# Patient Record
Sex: FEMALE | Employment: PART TIME | URBAN - METROPOLITAN AREA
[De-identification: names, ages, dates, MRNs, and addresses within clinical notes are randomized per-mention and may not be internally consistent; named-entity substitution may affect disease eponyms.]

---

## 2018-03-19 ENCOUNTER — HOSPITAL ENCOUNTER (OUTPATIENT)
Age: 23
Setting detail: SPECIMEN
Discharge: HOME | End: 2018-03-19
Payer: MEDICARE

## 2018-03-20 LAB
CHLAMYDIA BY THIN PREP: ABNORMAL
N. GONORRHOEAE DNA, THIN PREP: NEGATIVE

## 2018-04-04 LAB — CYTOLOGY REPORT: NORMAL

## 2018-08-28 ENCOUNTER — HOSPITAL ENCOUNTER (OUTPATIENT)
Age: 23
Setting detail: SPECIMEN
Discharge: HOME | End: 2018-08-28
Payer: MEDICARE

## 2018-08-28 LAB
ALBUMIN SERPL-MCNC: 4.4 G/DL (ref 3.5–5.2)
ALBUMIN/GLOBULIN RATIO: 1.4 (ref 1–2.5)
ALP BLD-CCNC: 58 U/L (ref 35–104)
ALT SERPL-CCNC: 10 U/L (ref 5–33)
ANION GAP SERPL CALCULATED.3IONS-SCNC: 12 MMOL/L (ref 9–17)
AST SERPL-CCNC: 14 U/L
BILIRUB SERPL-MCNC: 0.22 MG/DL (ref 0.3–1.2)
BUN BLDV-MCNC: 11 MG/DL (ref 6–20)
BUN/CREAT BLD: ABNORMAL (ref 9–20)
CALCIUM SERPL-MCNC: 9.4 MG/DL (ref 8.6–10.4)
CHLORIDE BLD-SCNC: 108 MMOL/L (ref 98–107)
CO2: 23 MMOL/L (ref 20–31)
CREAT SERPL-MCNC: 0.75 MG/DL (ref 0.5–0.9)
ESTIMATED AVERAGE GLUCOSE: 103 MG/DL
GFR AFRICAN AMERICAN: >60 ML/MIN
GFR NON-AFRICAN AMERICAN: >60 ML/MIN
GFR SERPL CREATININE-BSD FRML MDRD: ABNORMAL ML/MIN/{1.73_M2}
GFR SERPL CREATININE-BSD FRML MDRD: ABNORMAL ML/MIN/{1.73_M2}
GLUCOSE BLD-MCNC: 84 MG/DL (ref 70–99)
HAV IGM SER IA-ACNC: NONREACTIVE
HBA1C MFR BLD: 5.2 % (ref 4–6)
HEPATITIS B CORE IGM ANTIBODY: NONREACTIVE
HEPATITIS B SURFACE ANTIGEN: NONREACTIVE
HEPATITIS C ANTIBODY: NONREACTIVE
POTASSIUM SERPL-SCNC: 4.2 MMOL/L (ref 3.7–5.3)
RUBV IGG SER QL: 27.1 IU/ML
SODIUM BLD-SCNC: 143 MMOL/L (ref 135–144)
TOTAL PROTEIN: 7.6 G/DL (ref 6.4–8.3)
TSH SERPL DL<=0.05 MIU/L-ACNC: 0.66 MIU/L (ref 0.3–5)

## 2018-08-29 LAB
MEASLES IMMUNE (IGG): 1.06
MUV IGG SER QL: 2.18
VZV IGG SER QL IA: 1.26

## 2018-08-30 LAB
AMPHETAMINE: NEGATIVE NG/ML
BARBITURATES: NEGATIVE NG/ML
BENZODIAZEPINES: NEGATIVE NG/ML
BUPRENORPHINE: NEGATIVE NG/ML
COCAINE: NEGATIVE NG/ML
DRUGS OF ABUSE COMMENT: NORMAL
METHADONE: NEGATIVE NG/ML
METHAMPHETAMINE: NEGATIVE NG/ML
OPIATES: NEGATIVE NG/ML
OXYCODONE: NEGATIVE NG/ML
PHENCYCLIDINE: NEGATIVE NG/ML
THC: NEGATIVE NG/ML

## 2019-05-30 ENCOUNTER — HOSPITAL ENCOUNTER (EMERGENCY)
Age: 24
Discharge: HOME | End: 2019-05-30
Attending: EMERGENCY MEDICINE
Payer: COMMERCIAL

## 2019-05-30 ENCOUNTER — APPOINTMENT (OUTPATIENT)
Dept: GENERAL RADIOLOGY | Age: 24
End: 2019-05-30
Payer: COMMERCIAL

## 2019-05-30 VITALS
WEIGHT: 190 LBS | DIASTOLIC BLOOD PRESSURE: 63 MMHG | OXYGEN SATURATION: 98 % | SYSTOLIC BLOOD PRESSURE: 117 MMHG | HEART RATE: 82 BPM | BODY MASS INDEX: 33.66 KG/M2 | RESPIRATION RATE: 22 BRPM | HEIGHT: 63 IN | TEMPERATURE: 98.7 F

## 2019-05-30 DIAGNOSIS — R07.9 CHEST PAIN, UNSPECIFIED TYPE: Primary | ICD-10-CM

## 2019-05-30 LAB
ABSOLUTE EOS #: 0.08 K/UL (ref 0–0.44)
ABSOLUTE IMMATURE GRANULOCYTE: <0.03 K/UL (ref 0–0.3)
ABSOLUTE LYMPH #: 2.46 K/UL (ref 1.1–3.7)
ABSOLUTE MONO #: 0.4 K/UL (ref 0.1–1.2)
ANION GAP SERPL CALCULATED.3IONS-SCNC: 11 MMOL/L (ref 9–17)
BASOPHILS # BLD: 0 % (ref 0–2)
BASOPHILS ABSOLUTE: <0.03 K/UL (ref 0–0.2)
BUN BLDV-MCNC: 14 MG/DL (ref 6–20)
BUN/CREAT BLD: ABNORMAL (ref 9–20)
CALCIUM SERPL-MCNC: 9.1 MG/DL (ref 8.6–10.4)
CHLORIDE BLD-SCNC: 108 MMOL/L (ref 98–107)
CO2: 23 MMOL/L (ref 20–31)
CREAT SERPL-MCNC: 0.75 MG/DL (ref 0.5–0.9)
DIFFERENTIAL TYPE: ABNORMAL
EKG ATRIAL RATE: 77 BPM
EKG P AXIS: 56 DEGREES
EKG P-R INTERVAL: 144 MS
EKG Q-T INTERVAL: 380 MS
EKG QRS DURATION: 74 MS
EKG QTC CALCULATION (BAZETT): 430 MS
EKG R AXIS: 55 DEGREES
EKG T AXIS: 34 DEGREES
EKG VENTRICULAR RATE: 77 BPM
EOSINOPHILS RELATIVE PERCENT: 1 % (ref 1–4)
GFR AFRICAN AMERICAN: >60 ML/MIN
GFR NON-AFRICAN AMERICAN: >60 ML/MIN
GFR SERPL CREATININE-BSD FRML MDRD: ABNORMAL ML/MIN/{1.73_M2}
GFR SERPL CREATININE-BSD FRML MDRD: ABNORMAL ML/MIN/{1.73_M2}
GLUCOSE BLD-MCNC: 96 MG/DL (ref 70–99)
HCT VFR BLD CALC: 36.9 % (ref 36.3–47.1)
HEMOGLOBIN: 11.1 G/DL (ref 11.9–15.1)
IMMATURE GRANULOCYTES: 0 %
LYMPHOCYTES # BLD: 45 % (ref 24–43)
MAGNESIUM: 2 MG/DL (ref 1.6–2.6)
MCH RBC QN AUTO: 29.4 PG (ref 25.2–33.5)
MCHC RBC AUTO-ENTMCNC: 30.1 G/DL (ref 28.4–34.8)
MCV RBC AUTO: 97.6 FL (ref 82.6–102.9)
MONOCYTES # BLD: 7 % (ref 3–12)
NRBC AUTOMATED: 0 PER 100 WBC
PDW BLD-RTO: 12.5 % (ref 11.8–14.4)
PLATELET # BLD: 274 K/UL (ref 138–453)
PLATELET ESTIMATE: ABNORMAL
PMV BLD AUTO: 10.2 FL (ref 8.1–13.5)
POTASSIUM SERPL-SCNC: 3.9 MMOL/L (ref 3.7–5.3)
RBC # BLD: 3.78 M/UL (ref 3.95–5.11)
RBC # BLD: ABNORMAL 10*6/UL
SEG NEUTROPHILS: 47 % (ref 36–65)
SEGMENTED NEUTROPHILS ABSOLUTE COUNT: 2.55 K/UL (ref 1.5–8.1)
SODIUM BLD-SCNC: 142 MMOL/L (ref 135–144)
TROPONIN INTERP: NORMAL
TROPONIN T: NORMAL NG/ML
TROPONIN, HIGH SENSITIVITY: <6 NG/L (ref 0–14)
WBC # BLD: 5.5 K/UL (ref 3.5–11.3)
WBC # BLD: ABNORMAL 10*3/UL

## 2019-05-30 PROCEDURE — 93005 ELECTROCARDIOGRAM TRACING: CPT | Performed by: EMERGENCY MEDICINE

## 2019-05-30 PROCEDURE — 93005 ELECTROCARDIOGRAM TRACING: CPT

## 2019-05-30 PROCEDURE — 85025 COMPLETE CBC W/AUTO DIFF WBC: CPT

## 2019-05-30 PROCEDURE — 93010 ELECTROCARDIOGRAM REPORT: CPT | Performed by: INTERNAL MEDICINE

## 2019-05-30 PROCEDURE — 71046 X-RAY EXAM CHEST 2 VIEWS: CPT

## 2019-05-30 PROCEDURE — 84484 ASSAY OF TROPONIN QUANT: CPT

## 2019-05-30 PROCEDURE — 6370000000 HC RX 637 (ALT 250 FOR IP): Performed by: EMERGENCY MEDICINE

## 2019-05-30 PROCEDURE — 80048 BASIC METABOLIC PNL TOTAL CA: CPT

## 2019-05-30 PROCEDURE — 99285 EMERGENCY DEPT VISIT HI MDM: CPT

## 2019-05-30 PROCEDURE — 83735 ASSAY OF MAGNESIUM: CPT

## 2019-05-30 RX ORDER — FAMOTIDINE 20 MG/1
20 TABLET, FILM COATED ORAL 2 TIMES DAILY PRN
Qty: 60 TABLET | Refills: 0 | Status: SHIPPED | OUTPATIENT
Start: 2019-05-30 | End: 2021-09-11

## 2019-05-30 RX ORDER — IBUPROFEN 800 MG/1
800 TABLET ORAL EVERY 8 HOURS PRN
Qty: 45 TABLET | Refills: 0 | Status: SHIPPED | OUTPATIENT
Start: 2019-05-30 | End: 2020-06-12

## 2019-05-30 RX ORDER — PANTOPRAZOLE SODIUM 20 MG/1
20 TABLET, DELAYED RELEASE ORAL
Qty: 30 TABLET | Refills: 0 | Status: SHIPPED | OUTPATIENT
Start: 2019-05-30 | End: 2021-09-11

## 2019-05-30 RX ORDER — CALCIUM CARBONATE 200(500)MG
1 TABLET,CHEWABLE ORAL DAILY
Qty: 30 TABLET | Refills: 0 | Status: SHIPPED | OUTPATIENT
Start: 2019-05-30 | End: 2019-06-29

## 2019-05-30 RX ORDER — ASPIRIN 81 MG/1
324 TABLET, CHEWABLE ORAL ONCE
Status: COMPLETED | OUTPATIENT
Start: 2019-05-30 | End: 2019-05-30

## 2019-05-30 RX ADMIN — ASPIRIN 81 MG 324 MG: 81 TABLET ORAL at 08:31

## 2019-05-30 SDOH — HEALTH STABILITY: MENTAL HEALTH: HOW OFTEN DO YOU HAVE A DRINK CONTAINING ALCOHOL?: NEVER

## 2019-05-30 ASSESSMENT — ENCOUNTER SYMPTOMS
DIARRHEA: 0
VOMITING: 0
ABDOMINAL PAIN: 0
CHEST TIGHTNESS: 0
SHORTNESS OF BREATH: 0
COUGH: 0
EYE REDNESS: 0
EYE DISCHARGE: 0
RHINORRHEA: 0
NAUSEA: 0
SORE THROAT: 0
BLOOD IN STOOL: 0

## 2019-05-30 NOTE — ED PROVIDER NOTES
[facility] ED  Emergency Department Encounter  EmergencyMedicine Resident     Pt Allie Marie  MRN: [id number]  [place] 1995  Date of evaluation: 5/30/19  PCP:  Bassam Leyva NP-C    [address] [place] [address]       Chief Complaint   Patient presents with    Chest Pain     pt was woken from her sleep with a sharp chest pain shooting through to her back followed by dizziness       HISTORY OF PRESENT ILLNESS  (Location/Symptom, Timing/Onset, Context/Setting, Quality, Duration, Modifying Factors, Severity.)      Jennifer Dacosta is a 21 y.o. female who presents with complaints of chest pain that started when she was walking to work. Pt states that she has had this similar chest pain, not associated with exertion, for the past several years and this is the first time that it has happened with exertion. Pt is well appearing, non-toxic, no significant risk factors, low risk for ACS, PERC rule used to rule out PE, no daily medications. No symptoms on initial evaluation. Patient denies any headache or change in vision, no nausea or vomiting, no fevers or chills, + chest pain, no shortness of breath, no abdominal pain, and no  symptoms including no hematuria or blood in stool, as well as no dysuria. PAST MEDICAL / SURGICAL / SOCIAL / FAMILY HISTORY      has no past medical history on file. has no past surgical history on file.     Social History     Socioeconomic History    Marital status: Single     Spouse name: Not on file    Number of children: Not on file    Years of education: Not on file    Highest education level: Not on file   Occupational History    Not on file   Social Needs    Financial resource strain: Not on file    Food insecurity:     Worry: Not on file     Inability: Not on file    Transportation needs:     Medical: Not on file     Non-medical: Not on file   Tobacco Use    Smoking status: Never Smoker    Smokeless tobacco: Never Used   Substance and Sexual Activity    warm and dry. Capillary refill takes less than 2 seconds. No rash noted. Psychiatric: She has a normal mood and affect.        DIFFERENTIAL  DIAGNOSIS     PLAN (LABS / IMAGING / EKG):  Orders Placed This Encounter   Procedures    XR CHEST STANDARD (2 VW)    Basic Metabolic Panel    CBC Auto Differential    Magnesium    Troponin    BP check on specific side/area    Telemetry monitoring    EKG 12 Lead    EKG REPORT    Insert peripheral IV       MEDICATIONS ORDERED:  Orders Placed This Encounter   Medications    aspirin chewable tablet 324 mg    famotidine (PEPCID) 20 MG tablet     Sig: Take 1 tablet by mouth 2 times daily as needed (reflux)     Dispense:  60 tablet     Refill:  0    pantoprazole (PROTONIX) 20 MG tablet     Sig: Take 1 tablet by mouth every morning (before breakfast)     Dispense:  30 tablet     Refill:  0    calcium carbonate (ANTACID) 500 MG chewable tablet     Sig: Take 1 tablet by mouth daily     Dispense:  30 tablet     Refill:  0    ibuprofen (IBU) 800 MG tablet     Sig: Take 1 tablet by mouth every 8 hours as needed for Pain     Dispense:  45 tablet     Refill:  0       DDX: MSK chest pain, GERD, MI/CAD, pleuritis, DVT/PE    DIAGNOSTIC RESULTS / EMERGENCY DEPARTMENT COURSE / MDM     LABS:  Results for orders placed or performed during the hospital encounter of 94/75/19   Basic Metabolic Panel   Result Value Ref Range    Glucose 96 70 - 99 mg/dL    BUN 14 6 - 20 mg/dL    CREATININE 0.75 0.50 - 0.90 mg/dL    Bun/Cre Ratio NOT REPORTED 9 - 20    Calcium 9.1 8.6 - 10.4 mg/dL    Sodium 142 135 - 144 mmol/L    Potassium 3.9 3.7 - 5.3 mmol/L    Chloride 108 (H) 98 - 107 mmol/L    CO2 23 20 - 31 mmol/L    Anion Gap 11 9 - 17 mmol/L    GFR Non-African American >60 >60 mL/min    GFR African American >60 >60 mL/min    GFR Comment          GFR Staging NOT REPORTED    CBC Auto Differential   Result Value Ref Range    WBC 5.5 3.5 - 11.3 k/uL    RBC 3.78 (L) 3.95 - 5.11 m/uL    Hemoglobin 11.1 (L) 11.9 - 15.1 g/dL    Hematocrit 36.9 36.3 - 47.1 %    MCV 97.6 82.6 - 102.9 fL    MCH 29.4 25.2 - 33.5 pg    MCHC 30.1 28.4 - 34.8 g/dL    RDW 12.5 11.8 - 14.4 %    Platelets 306 151 - 242 k/uL    MPV 10.2 8.1 - 13.5 fL    NRBC Automated 0.0 0.0 per 100 WBC    Differential Type NOT REPORTED     Seg Neutrophils 47 36 - 65 %    Lymphocytes 45 (H) 24 - 43 %    Monocytes 7 3 - 12 %    Eosinophils % 1 1 - 4 %    Basophils 0 0 - 2 %    Immature Granulocytes 0 0 %    Segs Absolute 2.55 1.50 - 8.10 k/uL    Absolute Lymph # 2.46 1.10 - 3.70 k/uL    Absolute Mono # 0.40 0.10 - 1.20 k/uL    Absolute Eos # 0.08 0.00 - 0.44 k/uL    Basophils # <0.03 0.00 - 0.20 k/uL    Absolute Immature Granulocyte <0.03 0.00 - 0.30 k/uL    WBC Morphology NOT REPORTED     RBC Morphology NOT REPORTED     Platelet Estimate NOT REPORTED    Magnesium   Result Value Ref Range    Magnesium 2.0 1.6 - 2.6 mg/dL   Troponin   Result Value Ref Range    Troponin, High Sensitivity <6 0 - 14 ng/L    Troponin T NOT REPORTED <0.03 ng/mL    Troponin Interp NOT REPORTED    EKG 12 Lead   Result Value Ref Range    Ventricular Rate 77 BPM    Atrial Rate 77 BPM    P-R Interval 144 ms    QRS Duration 74 ms    Q-T Interval 380 ms    QTc Calculation (Bazett) 430 ms    P Axis 56 degrees    R Axis 55 degrees    T Axis 34 degrees       IMPRESSION/EMERGENCY DEPARTMENT COURSE:        ED Course as of May 30 1703   Thu May 30, 2019   0803 Plan is to obtain cardiac workup and reevaluate. Patient has no symptoms currently but states this episode of chest pain is different than her normal as it was exertional.  Patient states she has never had any workup for this in the past.  Patient is well-appearing, nontoxic, pending negative workup, likely discharge home to follow-up with primary care physician.     [JM]   4007 Cardiac workup unremarkable, discharged home with a prescription for Pepcid, pantoprazole, and Maalox along with Motrin and have her follow-up with her primary care physician or return if symptoms worsen or new onset symptoms occurs. Patient agreeable to plan stable for discharge home. Patient instructed to return to the Emergency Department if symptoms worsen or new onset of symptoms occurs. If patient did not have a Primary Care physician, they were given contact information to contact [facility] to setup as a new patient, for continued care and treatment. Discussed findings of laboratory workup and imaging, if applicable. All questions and concerns were answered, and patient offered no additional complaints or concerns. Return precautions were given to patient, patient acknowledged understanding of return precautions and was able to relay signs and symptoms back that would need them to return to the Emergency Department. All prescriptions if given were discussed. Pt is agreeable to plan and is stable for discharge home at this time. [JM]      ED Course User Index  [JM] Kristine Favorite, DO     Low risk for ACS, PERC'd out. PCP follow up. Negative cardiac workup. Clinically improved on re-evaluation. RADIOLOGY:  Xr Chest Standard (2 Vw)    Result Date: 5/30/2019  EXAMINATION: TWO XRAY VIEWS OF THE CHEST 5/30/2019 8:08 am COMPARISON: None. HISTORY: ORDERING SYSTEM PROVIDED HISTORY: chest pain TECHNOLOGIST PROVIDED HISTORY: chest pain Ordering Physician Provided Reason for Exam: cp since this am Acuity: Unknown Type of Exam: Unknown FINDINGS: Heart size and pulmonary vessels are within normal limits. Lungs are clear. No focal infiltrates or significant pleural effusions are seen. Monitor leads overlie the chest.  There is no acute osseous abnormality. No acute cardiopulmonary process.        EKG  EKG Interpretation    Interpreted by me    Rhythm: normal sinus   Rate: normal  Axis: normal  Ectopy: none  Conduction: normal  ST Segments: no acute change  T Waves: no acute change  Q Waves: none    Clinical Impression: no acute changes and normal EKG    All EKG's are interpreted by the Emergency Department Physician who either signs or Co-signs thischart in the absence of a cardiologist.      PROCEDURES:  None    CONSULTS:  None    CRITICAL CARE:  None    FINAL IMPRESSION      1.  Chest pain, unspecified type          DISPOSITION / PLAN     DISPOSITION        PATIENT REFERRED TO:  Zoë Costa  [address] [facility] [address] [facility]  [phone]    Schedule an appointment as soon as possible for a visit       [facility] ED  [address] [address]  [phone]  Go to   If symptoms worsen      DISCHARGE MEDICATIONS:  Discharge Medication List as of 5/30/2019  8:50 AM      START taking these medications    Details   famotidine (PEPCID) 20 MG tablet Take 1 tablet by mouth 2 times daily as needed (reflux), Disp-60 tablet, R-0Print      pantoprazole (PROTONIX) 20 MG tablet Take 1 tablet by mouth every morning (before breakfast), Disp-30 tablet, R-0Print      calcium carbonate (ANTACID) 500 MG chewable tablet Take 1 tablet by mouth daily, Disp-30 tablet, R-0Print      ibuprofen (IBU) 800 MG tablet Take 1 tablet by mouth every 8 hours as needed for Pain, Disp-45 tablet, R-0Print             Victor Hugo Kelly DO  Emergency Medicine Resident    (Please note that portions of this note were completed with a voice recognition program.  Effortswere made to edit the dictations but occasionally words are mis-transcribed.)     Victor Hugo Klely DO  05/30/19 [id number]

## 2019-06-05 ENCOUNTER — HOSPITAL ENCOUNTER (OUTPATIENT)
Age: 24
Setting detail: SPECIMEN
Discharge: HOME | End: 2019-06-05
Payer: COMMERCIAL

## 2019-06-05 LAB
HAV IGM SER IA-ACNC: NONREACTIVE
HCG QUANTITATIVE: <1 IU/L
HEPATITIS B CORE IGM ANTIBODY: NONREACTIVE
HEPATITIS B SURFACE ANTIGEN: NONREACTIVE
HEPATITIS C ANTIBODY: NONREACTIVE
HIV AG/AB: NONREACTIVE
T. PALLIDUM, IGG: NONREACTIVE

## 2019-06-07 LAB — MEASLES IMMUNE (IGG): 1.54

## 2020-06-12 ENCOUNTER — HOSPITAL ENCOUNTER (EMERGENCY)
Age: 25
Discharge: HOME | End: 2020-06-12
Attending: EMERGENCY MEDICINE
Payer: COMMERCIAL

## 2020-06-12 VITALS
HEART RATE: 97 BPM | RESPIRATION RATE: 18 BRPM | WEIGHT: 230 LBS | BODY MASS INDEX: 40.75 KG/M2 | OXYGEN SATURATION: 100 % | DIASTOLIC BLOOD PRESSURE: 90 MMHG | HEIGHT: 63 IN | SYSTOLIC BLOOD PRESSURE: 156 MMHG | TEMPERATURE: 98.2 F

## 2020-06-12 PROCEDURE — 99282 EMERGENCY DEPT VISIT SF MDM: CPT

## 2020-06-12 PROCEDURE — 6360000002 HC RX W HCPCS: Performed by: STUDENT IN AN ORGANIZED HEALTH CARE EDUCATION/TRAINING PROGRAM

## 2020-06-12 PROCEDURE — 6370000000 HC RX 637 (ALT 250 FOR IP): Performed by: STUDENT IN AN ORGANIZED HEALTH CARE EDUCATION/TRAINING PROGRAM

## 2020-06-12 PROCEDURE — 96372 THER/PROPH/DIAG INJ SC/IM: CPT

## 2020-06-12 RX ORDER — KETOROLAC TROMETHAMINE 30 MG/ML
30 INJECTION, SOLUTION INTRAMUSCULAR; INTRAVENOUS ONCE
Status: COMPLETED | OUTPATIENT
Start: 2020-06-12 | End: 2020-06-12

## 2020-06-12 RX ORDER — PENICILLIN V POTASSIUM 500 MG/1
500 TABLET ORAL 4 TIMES DAILY
Qty: 28 TABLET | Refills: 0 | Status: SHIPPED | OUTPATIENT
Start: 2020-06-12 | End: 2020-06-19

## 2020-06-12 RX ORDER — IBUPROFEN 800 MG/1
800 TABLET ORAL EVERY 6 HOURS PRN
Qty: 8 TABLET | Refills: 0 | Status: SHIPPED | OUTPATIENT
Start: 2020-06-12 | End: 2021-09-11

## 2020-06-12 RX ORDER — PENICILLIN V POTASSIUM 250 MG/1
500 TABLET ORAL ONCE
Status: COMPLETED | OUTPATIENT
Start: 2020-06-12 | End: 2020-06-12

## 2020-06-12 RX ADMIN — KETOROLAC TROMETHAMINE 30 MG: 30 INJECTION, SOLUTION INTRAMUSCULAR at 01:56

## 2020-06-12 RX ADMIN — BENZOCAINE 1 EACH: 220 GEL, DENTIFRICE DENTAL at 02:28

## 2020-06-12 RX ADMIN — PENICILLIN V POTASSIUM 500 MG: 250 TABLET ORAL at 01:56

## 2020-06-12 ASSESSMENT — ENCOUNTER SYMPTOMS
ABDOMINAL PAIN: 0
SHORTNESS OF BREATH: 0

## 2020-06-12 ASSESSMENT — PAIN SCALES - GENERAL
PAINLEVEL_OUTOF10: 10
PAINLEVEL_OUTOF10: 10

## 2020-06-12 ASSESSMENT — PAIN DESCRIPTION - LOCATION: LOCATION: TEETH

## 2020-06-12 ASSESSMENT — PAIN DESCRIPTION - FREQUENCY: FREQUENCY: CONTINUOUS

## 2020-06-12 ASSESSMENT — PAIN DESCRIPTION - DESCRIPTORS: DESCRIPTORS: SHARP;PRESSURE

## 2020-06-12 ASSESSMENT — PAIN DESCRIPTION - PAIN TYPE: TYPE: ACUTE PAIN

## 2020-06-12 ASSESSMENT — PAIN DESCRIPTION - ORIENTATION: ORIENTATION: LEFT;UPPER

## 2020-06-12 NOTE — ED PROVIDER NOTES
on file    Stress: Not on file   Relationships    Social connections     Talks on phone: Not on file     Gets together: Not on file     Attends [employer] service: Not on file     Active member of club or organization: Not on file     Attends meetings of clubs or organizations: Not on file     Relationship status: Not on file    Intimate partner violence     Fear of current or ex partner: Not on file     Emotionally abused: Not on file     Physically abused: Not on file     Forced sexual activity: Not on file   Other Topics Concern    Not on file   Social History Narrative    Not on file       History reviewed. No pertinent family history. Allergies:  Patient has no known allergies. Home Medications:  Prior to Admission medications    Medication Sig Start Date End Date Taking? Authorizing Provider   penicillin v potassium (VEETID) 500 MG tablet Take 1 tablet by mouth 4 times daily for 7 days 6/12/20 6/19/20 Yes Briana Villarreal MD   ibuprofen (IBU) 800 MG tablet Take 1 tablet by mouth every 6 hours as needed for Pain 6/12/20  Yes Briana Villarreal MD   famotidine (PEPCID) 20 MG tablet Take 1 tablet by mouth 2 times daily as needed (reflux) 5/30/19   Hernandez Jackson, DO   pantoprazole (PROTONIX) 20 MG tablet Take 1 tablet by mouth every morning (before breakfast) 5/30/19   Hernandez Jackson, DO       REVIEW OFSYSTEMS    (2-9 systems for level 4, 10 or more for level 5)      Review of Systems   Constitutional: Negative for fever. HENT: Positive for dental problem. Eyes: Negative for visual disturbance. Respiratory: Negative for shortness of breath. Cardiovascular: Negative for chest pain. Gastrointestinal: Negative for abdominal pain. Genitourinary: Negative for dysuria. Musculoskeletal: Negative for neck stiffness. Skin: Negative for wound. Neurological: Positive for headaches. Psychiatric/Behavioral: Negative for confusion.        PHYSICAL EXAM   (up to 7 for level 4, 8 or more (TORADOL) injection 30 mg    DISCONTD: benzocaine (ORAJEL) 10 % mucosal gel    penicillin v potassium (VEETID) tablet 500 mg    benzocaine (LOLLICAINE) 20 % dental swab 1 each    penicillin v potassium (VEETID) 500 MG tablet     Sig: Take 1 tablet by mouth 4 times daily for 7 days     Dispense:  28 tablet     Refill:  0    ibuprofen (IBU) 800 MG tablet     Sig: Take 1 tablet by mouth every 6 hours as needed for Pain     Dispense:  8 tablet     Refill:  0       DDX:     Dental abscess  Andrew's angina  Posterior pharynx abscess  Dental infection  Dental trauma  Nerve root exposure  Tooth infection    Initial MDM/Plan: 31 y.o. female who presents with dental pain, secondary to tooth/was not treated. Patient did have a visit to formation on the tooth 16, no obvious signs of infection in that side, no abscess formation no area to tu. There are signs of tooth infection and other areas of her mouth particularly at tooth #17. Patient will be given penicillin, lidocaine, Toradol IM for pain control. Patient will be likely discharged with dental follow-up. Discussed dental block with attending, no plans for dental injection at this time due to the site of the pain and tooth condition. DIAGNOSTIC RESULTS / EMERGENCYDEPARTMENT COURSE / MDM     LABS:  No results found for this visit on 06/12/20. RADIOLOGY:  No orders to display         EMERGENCY DEPARTMENT COURSE:  ED Course as of Jun 12 0201 Fri Jun 12, 2020   0137 Patient seen and assessed in the emergency department no acute respiratory or cardiovascular distress. Patient complaining of tooth pain on the left upper molar area, tooth #16. There is a wisdom tooth pushing against molar on that side, no obvious signs of infection, abscess formation on that region. Patient does have signs of decay acute infection on tooth #17, but no obvious areas of abscess requiring incision.   Patient denies fevers, chills, abdominal pain, nausea or vomiting.      [PS] ED Course User Index  [PS] Rosi De León MD          PROCEDURES:  None    CONSULTS:  None    CRITICAL CARE:  Please see attending note    FINAL IMPRESSION      1.  Pain, dental          DISPOSITION / PLAN     DISPOSITION     discharge    PATIENT REFERRED TO:  Hermila Victoria [facility]  [address] [facility]  [phone]  Schedule an appointment as soon as possible for a visit         DISCHARGE MEDICATIONS:  New Prescriptions    IBUPROFEN (IBU) 800 MG TABLET    Take 1 tablet by mouth every 6 hours as needed for Pain    PENICILLIN V POTASSIUM (VEETID) 500 MG TABLET    Take 1 tablet by mouth 4 times daily for 7 days       Rosi De León MD  Emergency Medicine Resident    (Please note that portions of this note were completed with a voice recognition program.Efforts were made to edit the dictations but occasionally words are mis-transcribed.)       Rosi De León MD  Resident  06/12/20 [id number]

## 2020-06-12 NOTE — ED TRIAGE NOTES
Pt came in c/o back left upper dental pain. [place] tooth looks to be impacted. Pain started about an hour ago. Pt denies any bleeding or drainage. RR even and unlabored, NAD, A&O, ambulatory.  Call light in reach at bedside

## 2021-09-09 ENCOUNTER — HOSPITAL ENCOUNTER (EMERGENCY)
Age: 26
Discharge: HOME | End: 2021-09-10
Attending: EMERGENCY MEDICINE
Payer: COMMERCIAL

## 2021-09-09 DIAGNOSIS — O21.9 NAUSEA AND VOMITING IN PREGNANCY PRIOR TO 22 WEEKS GESTATION: Primary | ICD-10-CM

## 2021-09-09 LAB
ABSOLUTE EOS #: <0.03 K/UL (ref 0–0.44)
ABSOLUTE IMMATURE GRANULOCYTE: 0.06 K/UL (ref 0–0.3)
ABSOLUTE LYMPH #: 1.1 K/UL (ref 1.1–3.7)
ABSOLUTE MONO #: 0.32 K/UL (ref 0.1–1.2)
ALBUMIN SERPL-MCNC: 4.1 G/DL (ref 3.5–5.2)
ALBUMIN/GLOBULIN RATIO: 1 (ref 1–2.5)
ALP BLD-CCNC: 78 U/L (ref 35–104)
ALT SERPL-CCNC: 11 U/L (ref 5–33)
ANION GAP SERPL CALCULATED.3IONS-SCNC: 15 MMOL/L (ref 9–17)
AST SERPL-CCNC: 19 U/L
BASOPHILS # BLD: 0 % (ref 0–2)
BASOPHILS ABSOLUTE: <0.03 K/UL (ref 0–0.2)
BILIRUB SERPL-MCNC: 0.32 MG/DL (ref 0.3–1.2)
BUN BLDV-MCNC: 7 MG/DL (ref 6–20)
BUN/CREAT BLD: NORMAL (ref 9–20)
CALCIUM SERPL-MCNC: 10.1 MG/DL (ref 8.6–10.4)
CHLORIDE BLD-SCNC: 101 MMOL/L (ref 98–107)
CO2: 21 MMOL/L (ref 20–31)
CREAT SERPL-MCNC: 0.54 MG/DL (ref 0.5–0.9)
DIFFERENTIAL TYPE: ABNORMAL
EOSINOPHILS RELATIVE PERCENT: 0 % (ref 1–4)
GFR AFRICAN AMERICAN: >60 ML/MIN
GFR NON-AFRICAN AMERICAN: >60 ML/MIN
GFR SERPL CREATININE-BSD FRML MDRD: NORMAL ML/MIN/{1.73_M2}
GFR SERPL CREATININE-BSD FRML MDRD: NORMAL ML/MIN/{1.73_M2}
GLUCOSE BLD-MCNC: 97 MG/DL (ref 70–99)
HCT VFR BLD CALC: 40.6 % (ref 36.3–47.1)
HEMOGLOBIN: 12.8 G/DL (ref 11.9–15.1)
IMMATURE GRANULOCYTES: 1 %
LYMPHOCYTES # BLD: 9 % (ref 24–43)
MCH RBC QN AUTO: 29.2 PG (ref 25.2–33.5)
MCHC RBC AUTO-ENTMCNC: 31.5 G/DL (ref 28.4–34.8)
MCV RBC AUTO: 92.5 FL (ref 82.6–102.9)
MONOCYTES # BLD: 3 % (ref 3–12)
NRBC AUTOMATED: 0 PER 100 WBC
PDW BLD-RTO: 13.4 % (ref 11.8–14.4)
PLATELET # BLD: 331 K/UL (ref 138–453)
PLATELET ESTIMATE: ABNORMAL
PMV BLD AUTO: 9.8 FL (ref 8.1–13.5)
POTASSIUM SERPL-SCNC: 4 MMOL/L (ref 3.7–5.3)
RBC # BLD: 4.39 M/UL (ref 3.95–5.11)
RBC # BLD: ABNORMAL 10*6/UL
SEG NEUTROPHILS: 87 % (ref 36–65)
SEGMENTED NEUTROPHILS ABSOLUTE COUNT: 10.83 K/UL (ref 1.5–8.1)
SODIUM BLD-SCNC: 137 MMOL/L (ref 135–144)
TOTAL PROTEIN: 8.2 G/DL (ref 6.4–8.3)
WBC # BLD: 12.3 K/UL (ref 3.5–11.3)
WBC # BLD: ABNORMAL 10*3/UL

## 2021-09-09 PROCEDURE — 6360000002 HC RX W HCPCS: Performed by: STUDENT IN AN ORGANIZED HEALTH CARE EDUCATION/TRAINING PROGRAM

## 2021-09-09 PROCEDURE — 85025 COMPLETE CBC W/AUTO DIFF WBC: CPT

## 2021-09-09 PROCEDURE — 87086 URINE CULTURE/COLONY COUNT: CPT

## 2021-09-09 PROCEDURE — 81001 URINALYSIS AUTO W/SCOPE: CPT

## 2021-09-09 PROCEDURE — 96365 THER/PROPH/DIAG IV INF INIT: CPT

## 2021-09-09 PROCEDURE — 6370000000 HC RX 637 (ALT 250 FOR IP): Performed by: STUDENT IN AN ORGANIZED HEALTH CARE EDUCATION/TRAINING PROGRAM

## 2021-09-09 PROCEDURE — 2580000003 HC RX 258: Performed by: STUDENT IN AN ORGANIZED HEALTH CARE EDUCATION/TRAINING PROGRAM

## 2021-09-09 PROCEDURE — 96366 THER/PROPH/DIAG IV INF ADDON: CPT

## 2021-09-09 PROCEDURE — 80053 COMPREHEN METABOLIC PANEL: CPT

## 2021-09-09 PROCEDURE — 96361 HYDRATE IV INFUSION ADD-ON: CPT

## 2021-09-09 PROCEDURE — 99284 EMERGENCY DEPT VISIT MOD MDM: CPT

## 2021-09-09 PROCEDURE — 83735 ASSAY OF MAGNESIUM: CPT

## 2021-09-09 PROCEDURE — 96375 TX/PRO/DX INJ NEW DRUG ADDON: CPT

## 2021-09-09 RX ORDER — 0.9 % SODIUM CHLORIDE 0.9 %
1000 INTRAVENOUS SOLUTION INTRAVENOUS ONCE
Status: COMPLETED | OUTPATIENT
Start: 2021-09-09 | End: 2021-09-10

## 2021-09-09 RX ADMIN — PYRIDOXINE HYDROCHLORIDE 250 MG: 100 INJECTION, SOLUTION INTRAMUSCULAR; INTRAVENOUS at 23:25

## 2021-09-09 RX ADMIN — SODIUM CHLORIDE 1000 ML: 9 INJECTION, SOLUTION INTRAVENOUS at 22:48

## 2021-09-09 RX ADMIN — DOXYLAMINE SUCCINATE 25 MG: 25 TABLET ORAL at 23:24

## 2021-09-09 ASSESSMENT — ENCOUNTER SYMPTOMS
CONSTIPATION: 0
NAUSEA: 1
RHINORRHEA: 0
DIARRHEA: 0
PHOTOPHOBIA: 0
VOMITING: 1
SORE THROAT: 0
ABDOMINAL PAIN: 0
SHORTNESS OF BREATH: 0

## 2021-09-10 VITALS
OXYGEN SATURATION: 98 % | DIASTOLIC BLOOD PRESSURE: 70 MMHG | BODY MASS INDEX: 41.64 KG/M2 | RESPIRATION RATE: 18 BRPM | HEIGHT: 63 IN | TEMPERATURE: 98.7 F | SYSTOLIC BLOOD PRESSURE: 118 MMHG | HEART RATE: 94 BPM | WEIGHT: 235 LBS

## 2021-09-10 LAB
-: ABNORMAL
AMORPHOUS: ABNORMAL
BACTERIA: ABNORMAL
BILIRUBIN URINE: ABNORMAL
CASTS UA: ABNORMAL /LPF (ref 0–8)
COLOR: ABNORMAL
CRYSTALS, UA: ABNORMAL /HPF
EPITHELIAL CELLS UA: ABNORMAL /HPF (ref 0–5)
GLUCOSE URINE: NEGATIVE
KETONES, URINE: ABNORMAL
LEUKOCYTE ESTERASE, URINE: NEGATIVE
MAGNESIUM: 1.2 MG/DL (ref 1.6–2.6)
MUCUS: ABNORMAL
NITRITE, URINE: NEGATIVE
OTHER OBSERVATIONS UA: ABNORMAL
PH UA: 5.5 (ref 5–8)
PROTEIN UA: ABNORMAL
RBC UA: ABNORMAL /HPF (ref 0–4)
RENAL EPITHELIAL, UA: ABNORMAL /HPF
SPECIFIC GRAVITY UA: 1.03 (ref 1–1.03)
TRICHOMONAS: ABNORMAL
TURBIDITY: ABNORMAL
URINE HGB: NEGATIVE
UROBILINOGEN, URINE: NORMAL
WBC UA: ABNORMAL /HPF (ref 0–5)
YEAST: ABNORMAL

## 2021-09-10 PROCEDURE — 2580000003 HC RX 258: Performed by: GENERAL PRACTICE

## 2021-09-10 PROCEDURE — 6360000002 HC RX W HCPCS: Performed by: GENERAL PRACTICE

## 2021-09-10 PROCEDURE — 6360000002 HC RX W HCPCS: Performed by: STUDENT IN AN ORGANIZED HEALTH CARE EDUCATION/TRAINING PROGRAM

## 2021-09-10 RX ORDER — ONDANSETRON 4 MG/1
4 TABLET, ORALLY DISINTEGRATING ORAL EVERY 8 HOURS PRN
Qty: 20 TABLET | Refills: 0 | Status: SHIPPED | OUTPATIENT
Start: 2021-09-10 | End: 2021-09-11

## 2021-09-10 RX ORDER — ONDANSETRON 2 MG/ML
4 INJECTION INTRAMUSCULAR; INTRAVENOUS ONCE
Status: COMPLETED | OUTPATIENT
Start: 2021-09-10 | End: 2021-09-10

## 2021-09-10 RX ORDER — 0.9 % SODIUM CHLORIDE 0.9 %
1000 INTRAVENOUS SOLUTION INTRAVENOUS ONCE
Status: COMPLETED | OUTPATIENT
Start: 2021-09-10 | End: 2021-09-10

## 2021-09-10 RX ORDER — ONDANSETRON 2 MG/ML
INJECTION INTRAMUSCULAR; INTRAVENOUS
Status: DISCONTINUED
Start: 2021-09-10 | End: 2021-09-10 | Stop reason: HOSPADM

## 2021-09-10 RX ORDER — MAGNESIUM SULFATE IN WATER 40 MG/ML
INJECTION, SOLUTION INTRAVENOUS
Status: DISCONTINUED
Start: 2021-09-10 | End: 2021-09-10 | Stop reason: HOSPADM

## 2021-09-10 RX ORDER — MAGNESIUM SULFATE IN WATER 40 MG/ML
2000 INJECTION, SOLUTION INTRAVENOUS ONCE
Status: COMPLETED | OUTPATIENT
Start: 2021-09-10 | End: 2021-09-10

## 2021-09-10 RX ADMIN — SODIUM CHLORIDE 1000 ML: 9 INJECTION, SOLUTION INTRAVENOUS at 01:33

## 2021-09-10 RX ADMIN — MAGNESIUM SULFATE 2000 MG: 2 INJECTION INTRAVENOUS at 00:29

## 2021-09-10 RX ADMIN — ONDANSETRON 4 MG: 2 INJECTION, SOLUTION INTRAMUSCULAR; INTRAVENOUS at 01:04

## 2021-09-10 NOTE — ED PROVIDER NOTES
[address]  ED  Emergency Department Encounter  EmergencyMedicine Resident     Pt Edithtracey Laura Michele  MRN: [id number]  [place] 1995  Date of evaluation: 9/9/21  PCP:  Hiram COWAN, BECKY Gallegos NP    This patient was evaluated in the Emergency Department for symptoms described in the history of present illness. The patient was evaluated in the context of the global COVID-19 pandemic, which necessitated consideration that the patient might be at risk for infection with the SARS-CoV-2 virus that causes COVID-19. Institutional protocols and algorithms that pertain to the evaluation of patients at risk for COVID-19 are in a state of rapid change based on information released by regulatory bodies including the CDC and federal and state organizations. These policies and algorithms were followed during the patient's care in the ED. CHIEF COMPLAINT       Chief Complaint   Patient presents with    Nausea & Vomiting     pregnant 10 wks    Dizziness       HISTORY OF PRESENT ILLNESS  (Location/Symptom, Timing/Onset, Context/Setting, Quality, Duration, Modifying Factors, Severity.)      Romeo Hernandez is a 31 y.o. female who presents with nausea vomiting and dizziness. lmp 06/28/2021. 4 2 2 0 1 2 Delivered first baby at age 14 and second at 24. She had a garza IUP documented on transvaginal ultrasound approxi-1 week ago. Follows with- [facility] for her OB care. PAST MEDICAL / SURGICAL / SOCIAL / FAMILY HISTORY      has no past medical history on file. Morning sickness     has no past surgical history on file.   none    Social History     Socioeconomic History    Marital status: Single     Spouse name: Not on file    Number of children: Not on file    Years of education: Not on file    Highest education level: Not on file   Occupational History    Not on file   Tobacco Use    Smoking status: Never Smoker    Smokeless tobacco: Never Used   Vaping Use    Vaping Use: Never used   Substance and Sexual Activity    Alcohol use: Never    Drug use: Never    Sexual activity: Never   Other Topics Concern    Not on file   Social History Narrative    Not on file     Social Determinants of Health     Financial Resource Strain:     Difficulty of Paying Living Expenses:    Food Insecurity:     Worried About Running Out of Food in the Last Year:     [address] in the Last Year:    Transportation Needs:     Lack of Transportation (Medical):  Lack of Transportation (Non-Medical):    Physical Activity:     Days of Exercise per Week:     Minutes of Exercise per Session:    Stress:     Feeling of Stress :    Social Connections:     Frequency of Communication with Friends and Family:     Frequency of Social Gatherings with Friends and Family:     Attends [employer] Services:     Active Member of Clubs or Organizations:     Attends Club or Organization Meetings:     Marital Status:    Intimate Partner Violence:     Fear of Current or Ex-Partner:     Emotionally Abused:     Physically Abused:     Sexually Abused:        History reviewed. No pertinent family history. Allergies:  Patient has no known allergies. Home Medications:  Prior to Admission medications    Medication Sig Start Date End Date Taking? Authorizing Provider   ibuprofen (IBU) 800 MG tablet Take 1 tablet by mouth every 6 hours as needed for Pain 6/12/20   Francisca Pascal MD   famotidine (PEPCID) 20 MG tablet Take 1 tablet by mouth 2 times daily as needed (reflux) 5/30/19   Livier Monge DO   pantoprazole (PROTONIX) 20 MG tablet Take 1 tablet by mouth every morning (before breakfast) 5/30/19   Livier Monge DO       REVIEW OF SYSTEMS    (2-9 systems for level 4, 10 or more for level 5)      Review of Systems   Constitutional: Negative for fever. HENT: Negative for congestion, rhinorrhea and sore throat. Eyes: Negative for photophobia. Respiratory: Negative for shortness of breath. Cardiovascular: Negative for chest pain. Gastrointestinal: Positive for nausea and vomiting. Negative for abdominal pain, constipation and diarrhea. Genitourinary: Negative for decreased urine volume, dysuria, flank pain, vaginal bleeding, vaginal discharge and vaginal pain. Skin: Negative for pallor. Allergic/Immunologic: Negative for environmental allergies and food allergies. Neurological: Negative for syncope, weakness and numbness. Psychiatric/Behavioral: Negative for agitation and confusion. PHYSICAL EXAM   (up to 7 for level 4, 8 or more for level 5)      INITIAL VITALS:   /78   Pulse 105   Temp 99.3 °F (37.4 °C) (Oral)   Resp 18   Ht 5' 3\" (1.6 m)   Wt 235 lb (106.6 kg)   SpO2 98%   BMI 41.63 kg/m²     Physical Exam  Vitals and nursing note reviewed. Constitutional:       Appearance: She is obese. She is not toxic-appearing. HENT:      Head: Normocephalic and atraumatic. Right Ear: External ear normal.      Left Ear: External ear normal.      Nose: Nose normal.      Mouth/Throat:      Pharynx: Oropharynx is clear. Cardiovascular:      Rate and Rhythm: Regular rhythm. Tachycardia present. Pulmonary:      Effort: Pulmonary effort is normal.   Abdominal:      Palpations: Abdomen is soft. Tenderness: There is no guarding. Musculoskeletal:         General: Normal range of motion. Cervical back: Normal range of motion. Right lower leg: No edema. Left lower leg: No edema. Skin:     General: Skin is warm. Capillary Refill: Capillary refill takes less than 2 seconds. Neurological:      Mental Status: She is alert and oriented to person, place, and time.    Psychiatric:         Mood and Affect: Mood normal.         DIFFERENTIAL  DIAGNOSIS     PLAN (LABS / IMAGING / EKG):  Orders Placed This Encounter   Procedures    Culture, Urine    CBC WITH AUTO DIFFERENTIAL    Comprehensive Metabolic Panel    MAGNESIUM    Urinalysis with Microscopic MEDICATIONS ORDERED:  Orders Placed This Encounter   Medications    0.9 % sodium chloride bolus    doxyLAMINE succinate (GNP SLEEP AID) tablet 25 mg    pyridoxine (B-6) injection 250 mg       DDX: hyperemesis, dehydration, electrolyte abnormality    DIAGNOSTIC RESULTS / EMERGENCY DEPARTMENT COURSE / MDM   LAB RESULTS:  Results for orders placed or performed during the hospital encounter of 09/09/21   CBC WITH AUTO DIFFERENTIAL   Result Value Ref Range    WBC 12.3 (H) 3.5 - 11.3 k/uL    RBC 4.39 3.95 - 5.11 m/uL    Hemoglobin 12.8 11.9 - 15.1 g/dL    Hematocrit 40.6 36.3 - 47.1 %    MCV 92.5 82.6 - 102.9 fL    MCH 29.2 25.2 - 33.5 pg    MCHC 31.5 28.4 - 34.8 g/dL    RDW 13.4 11.8 - 14.4 %    Platelets 473 641 - 963 k/uL    MPV 9.8 8.1 - 13.5 fL    NRBC Automated 0.0 0.0 per 100 WBC    Differential Type NOT REPORTED     Seg Neutrophils 87 (H) 36 - 65 %    Lymphocytes 9 (L) 24 - 43 %    Monocytes 3 3 - 12 %    Eosinophils % 0 (L) 1 - 4 %    Basophils 0 0 - 2 %    Immature Granulocytes 1 (H) 0 %    Segs Absolute 10.83 (H) 1.50 - 8.10 k/uL    Absolute Lymph # 1.10 1.10 - 3.70 k/uL    Absolute Mono # 0.32 0.10 - 1.20 k/uL    Absolute Eos # <0.03 0.00 - 0.44 k/uL    Basophils Absolute <0.03 0.00 - 0.20 k/uL    Absolute Immature Granulocyte 0.06 0.00 - 0.30 k/uL    WBC Morphology NOT REPORTED     RBC Morphology NOT REPORTED     Platelet Estimate NOT REPORTED    Comprehensive Metabolic Panel   Result Value Ref Range    Glucose 97 70 - 99 mg/dL    BUN 7 6 - 20 mg/dL    CREATININE 0.54 0.50 - 0.90 mg/dL    Bun/Cre Ratio NOT REPORTED 9 - 20    Calcium 10.1 8.6 - 10.4 mg/dL    Sodium 137 135 - 144 mmol/L    Potassium 4.0 3.7 - 5.3 mmol/L    Chloride 101 98 - 107 mmol/L    CO2 21 20 - 31 mmol/L    Anion Gap 15 9 - 17 mmol/L    Alkaline Phosphatase 78 35 - 104 U/L    ALT 11 5 - 33 U/L    AST 19 <32 U/L    Total Bilirubin 0.32 0.3 - 1.2 mg/dL    Total Protein 8.2 6.4 - 8.3 g/dL    Albumin 4.1 3.5 - 5.2 g/dL Albumin/Globulin Ratio 1.0 1.0 - 2.5    GFR Non-African American >60 >60 mL/min    GFR African American >60 >60 mL/min    GFR Comment          GFR Staging NOT REPORTED        IMPRESSION: Alert oriented nontoxic 50-year-old female in no acute distress complaining of several episodes of vomiting decreased p.o. intake no abdominal pain some lightheadedness no vertiginous symptoms no syncope no loss of consciousness no injury she is due for 5-10 resuscitation vaginal discharge or bleeding no loss of fluids plan with bedside ultrasound labs urinalysis IV fluids antiemetics reevaluation    RADIOLOGY:  none    EKG  none    All EKG's are interpreted by the Emergency Department Physician who either signs or Co-signs this chart in the absence of a cardiologist.    EMERGENCY DEPARTMENT COURSE:  Seen and evaluated provided IV fluids IV thiamine doxylamine p.o. urinalysis was assessed bedside ultrasound was performed CBC and CMP were drawn    PROCEDURES:  FETAL ULTRASOUND (INTRAUTERINE PREGNANCY):    A limited, bedside pelvic ultrasound was performed using a transabdominal probe. The medical necessity was to evaluate for signs of fetal activity, heart tones and distress. The structures studied were the uterus and its contents. FINDINGS:  Fetus was visualized  Gross fetal movement was visualized. Fetal heart tones measured at 174 bpm.  The study was technically adequate. IMAGES:  Electronically uploaded to the PACS system      CONSULTS:  None    CRITICAL CARE:  none    FINAL IMPRESSION      1. Nausea and vomiting in pregnancy prior to 22 weeks gestation          DISPOSITION / [address] transferred to dr Matt Morris TO:  No follow-up provider specified.     DISCHARGE MEDICATIONS:  New Prescriptions    No medications on file       Marlon Rosenbaum DO  Emergency Medicine Resident    (Please note that portions of thisnote were completed with a voice recognition program.  Efforts were made to edit the dictations but occasionally words are mis-transcribed.)        Sudhir Rutledge DO  Resident  09/10/21 0778

## 2021-09-10 NOTE — ED PROVIDER NOTES
[facility]     Emergency Department     Faculty Attestation    I performed a history and physical examination of the patient and discussed management with the resident. I have reviewed and agree with the residents findings including all diagnostic interpretations, and treatment plans as written. Any areas of disagreement are noted on the chart. I was personally present for the key portions of any procedures. I have documented in the chart those procedures where I was not present during the key portions. I have reviewed the emergency nurses triage note. I agree with the chief complaint, past medical history, past surgical history, allergies, medications, social and family history as documented unless otherwise noted below. Documentation of the HPI, Physical Exam and Medical Decision Making performed by scribisabelle is based on my personal performance of the HPI, PE and MDM. For Physician Assistant/ Nurse Practitioner cases/documentation I have personally evaluated this patient and have completed at least one if not all key elements of the E/M (history, physical exam, and MDM). Additional findings are as noted. Patient with , has been having nausea and vomiting during this entire pregnancy. Unable to keep down anything. Decreased urination. No cramping no loss of fluid. Has been on Phenergan. Just saw her OB/GYN . But still throwing up. And feeling unwell. Patient on exam does have dry mucous membranes heart rate slightly elevated. Abdomen soft with nontender to palpation all quadrants. Patient overall nontoxic but concern for hyperemesis gravidarum will plan on IV fluids check electrolytes urine analysis. Antiemetics and reassessment.     Elías Torres D.O, M.P.H  Attending Emergency Medicine Physician         Elías Torres,   [phone]

## 2021-09-10 NOTE — ED NOTES
Pt tolerated PO challenge well.  Pt has no complaints of nausea and vomiting      Beatriz Goldmann, RN  09/10/21 0390

## 2021-09-10 NOTE — ED PROVIDER NOTES
[address]   Emergency Department  Emergency Medicine Attending Sign-out     Care of Alena Love was assumed from previous attending Dr. Johnny Cueva and is being seen for Nausea & Vomiting (pregnant 10 wks) and Dizziness  . The patient's initial evaluation and plan have been discussed with the prior provider who initially evaluated the patient. Attestation  I was available and discussed any additional care issues that arose and coordinated the management plans with the resident(s) caring for the patient during my duty period. Any areas of disagreement with resident's documentation of care or procedures are noted on the chart. I was personally present for the key portions of any/all procedures, during my duty period. I have documented in the chart those procedures where I was not present during the key portions. BRIEF PATIENT SUMMARY/MDM COURSE PER INITIAL PROVIDER:   RECENT VITALS:     Temp: 98.7 °F (37.1 °C),  Pulse: 94, Resp: 18, BP: 114/73, SpO2: 100 %    This patient is a 31 y.o. Female with nausea and vomiting in pregnancy. Patient is dehydrated with electrolyte abnormalities.   Awaiting IV fluid rehydration, electrolyte replacement, and p.o. challenge    DIAGNOSTICS/MEDICATIONS:     MEDICATIONS GIVEN:  ED Medication Orders (From admission, onward)    Start Ordered     Status Ordering Provider    09/10/21 0145 09/10/21 0131  0.9 % sodium chloride bolus  ONCE      Last MAR action: Stopped - by Adriane Lund on 09/10/21 at [address]    09/10/21 0100 09/10/21 0055  ondansetron (ZOFRAN) injection 4 mg  ONCE      Last MAR action: Given - by Nayana Avilez on 09/10/21 at [address] [facility]    09/10/21 0030 09/10/21 0018  magnesium sulfate 2000 mg in 50 mL IVPB premix  ONCE      Last MAR action: Stopped - by Adriane Lund on 09/10/21 at [address] [facility]    09/09/21 2145 09/09/21 2131  0.9 % sodium chloride bolus  ONCE      Last MAR action: Stopped - by Nayana Avilez on 09/10/21 at [address] [facility], [address]    09/09/21 2145 09/09/21 2131  doxyLAMINE succinate (GNP SLEEP AID) tablet 25 mg  ONCE      Last MAR action: Given - by Gold Joseph on 09/09/21 at [address]    09/09/21 2145 09/09/21 2131  pyridoxine (B-6) injection 250 mg  ONCE      Last MAR action: Given - by Gold Joseph [facility]21 [facility] [address] [facility] Reviewed   CBC WITH AUTO DIFFERENTIAL - Abnormal; Notable for the following components:       Result Value    WBC 12.3 (*)     Seg Neutrophils 87 (*)     Lymphocytes 9 (*)     Eosinophils % 0 (*)     Immature Granulocytes 1 (*)     Segs Absolute 10.83 (*)     All other components within normal limits   MAGNESIUM - Abnormal; Notable for the following components:    Magnesium 1.2 (*)     All other components within normal limits   URINALYSIS WITH MICROSCOPIC - Abnormal; Notable for the following components:    Color, UA DARK YELLOW (*)     Turbidity UA CLOUDY (*)     Bilirubin Urine NEGATIVE  Verified by ictotest. (*)     Ketones, Urine LARGE (*)     Specific Gravity, UA 1.034 (*)     Protein, UA TRACE (*)     All other components within normal limits   CULTURE, URINE   COMPREHENSIVE METABOLIC PANEL       RADIOLOGY  No results found. OUTSTANDING TASKS / ADDITIONAL COMMENTS   1. P.o. challenge  2.  Okay for discharge    Flora Workman MD  Emergency Medicine Attending  [facility]        Cruz Romo MD  09/10/21 2471

## 2021-09-10 NOTE — ED NOTES
The following labs labeled with pt sticker and tubed to lab:     [] Blue     [] Lavender   [] on ice  [] Green/yellow  [] Green/black [] on ice  [] Yellow  [] Red  [] Pink      [] COVID-19 swab    [] Rapid  [] PCR  [] Peds Viral Panel     [x] Urine Sample  [] Pelvic Cultures  [] Blood Cultures            Oskar Batista RN  09/10/21 6429

## 2021-09-11 ENCOUNTER — APPOINTMENT (OUTPATIENT)
Dept: CT IMAGING | Age: 26
DRG: 833 | End: 2021-09-11
Payer: COMMERCIAL

## 2021-09-11 ENCOUNTER — HOSPITAL ENCOUNTER (INPATIENT)
Age: 26
LOS: 1 days | Discharge: HOME | DRG: 833 | End: 2021-09-14
Attending: EMERGENCY MEDICINE | Admitting: EMERGENCY MEDICINE
Payer: COMMERCIAL

## 2021-09-11 DIAGNOSIS — R11.0 NAUSEA: Primary | ICD-10-CM

## 2021-09-11 PROBLEM — R42 DIZZINESS: Status: ACTIVE | Noted: 2021-09-11

## 2021-09-11 LAB
-: ABNORMAL
ABSOLUTE EOS #: <0.03 K/UL (ref 0–0.44)
ABSOLUTE IMMATURE GRANULOCYTE: 0.05 K/UL (ref 0–0.3)
ABSOLUTE LYMPH #: 0.79 K/UL (ref 1.1–3.7)
ABSOLUTE MONO #: 0.19 K/UL (ref 0.1–1.2)
ALBUMIN SERPL-MCNC: 3.9 G/DL (ref 3.5–5.2)
ALBUMIN/GLOBULIN RATIO: 1.1 (ref 1–2.5)
ALP BLD-CCNC: 71 U/L (ref 35–104)
ALT SERPL-CCNC: 12 U/L (ref 5–33)
AMORPHOUS: ABNORMAL
ANION GAP SERPL CALCULATED.3IONS-SCNC: 19 MMOL/L (ref 9–17)
AST SERPL-CCNC: 13 U/L
BACTERIA: ABNORMAL
BASOPHILS # BLD: 0 % (ref 0–2)
BASOPHILS ABSOLUTE: <0.03 K/UL (ref 0–0.2)
BILIRUB SERPL-MCNC: 0.26 MG/DL (ref 0.3–1.2)
BILIRUBIN URINE: NEGATIVE
BUN BLDV-MCNC: 7 MG/DL (ref 6–20)
BUN/CREAT BLD: ABNORMAL (ref 9–20)
CALCIUM SERPL-MCNC: 9.4 MG/DL (ref 8.6–10.4)
CASTS UA: ABNORMAL /LPF (ref 0–8)
CHLORIDE BLD-SCNC: 99 MMOL/L (ref 98–107)
CO2: 17 MMOL/L (ref 20–31)
COLOR: YELLOW
CREAT SERPL-MCNC: 0.67 MG/DL (ref 0.5–0.9)
CRYSTALS, UA: ABNORMAL /HPF
CULTURE: NORMAL
DIFFERENTIAL TYPE: ABNORMAL
EOSINOPHILS RELATIVE PERCENT: 0 % (ref 1–4)
EPITHELIAL CELLS UA: ABNORMAL /HPF (ref 0–5)
GFR AFRICAN AMERICAN: >60 ML/MIN
GFR NON-AFRICAN AMERICAN: >60 ML/MIN
GFR SERPL CREATININE-BSD FRML MDRD: ABNORMAL ML/MIN/{1.73_M2}
GFR SERPL CREATININE-BSD FRML MDRD: ABNORMAL ML/MIN/{1.73_M2}
GLUCOSE BLD-MCNC: 110 MG/DL (ref 70–99)
GLUCOSE URINE: NEGATIVE
HCT VFR BLD CALC: 38.2 % (ref 36.3–47.1)
HEMOGLOBIN: 11.8 G/DL (ref 11.9–15.1)
IMMATURE GRANULOCYTES: 1 %
KETONES, URINE: ABNORMAL
LEUKOCYTE ESTERASE, URINE: NEGATIVE
LYMPHOCYTES # BLD: 9 % (ref 24–43)
Lab: NORMAL
MCH RBC QN AUTO: 28.9 PG (ref 25.2–33.5)
MCHC RBC AUTO-ENTMCNC: 30.9 G/DL (ref 28.4–34.8)
MCV RBC AUTO: 93.4 FL (ref 82.6–102.9)
MONOCYTES # BLD: 2 % (ref 3–12)
MUCUS: ABNORMAL
NITRITE, URINE: NEGATIVE
NRBC AUTOMATED: 0 PER 100 WBC
OTHER OBSERVATIONS UA: ABNORMAL
PDW BLD-RTO: 13.4 % (ref 11.8–14.4)
PH UA: 6 (ref 5–8)
PLATELET # BLD: ABNORMAL K/UL (ref 138–453)
PLATELET ESTIMATE: ABNORMAL
PLATELET, FLUORESCENCE: NORMAL K/UL (ref 138–453)
PLATELET, IMMATURE FRACTION: NORMAL % (ref 1.1–10.3)
PMV BLD AUTO: ABNORMAL FL (ref 8.1–13.5)
POTASSIUM SERPL-SCNC: 3.6 MMOL/L (ref 3.7–5.3)
PROTEIN UA: NEGATIVE
RBC # BLD: 4.09 M/UL (ref 3.95–5.11)
RBC # BLD: ABNORMAL 10*6/UL
RBC UA: ABNORMAL /HPF (ref 0–4)
RENAL EPITHELIAL, UA: ABNORMAL /HPF
SEG NEUTROPHILS: 87 % (ref 36–65)
SEGMENTED NEUTROPHILS ABSOLUTE COUNT: 7.3 K/UL (ref 1.5–8.1)
SODIUM BLD-SCNC: 135 MMOL/L (ref 135–144)
SPECIFIC GRAVITY UA: 1.02 (ref 1–1.03)
SPECIMEN DESCRIPTION: NORMAL
TOTAL PROTEIN: 7.4 G/DL (ref 6.4–8.3)
TRICHOMONAS: ABNORMAL
TURBIDITY: CLEAR
URINE HGB: NEGATIVE
UROBILINOGEN, URINE: NORMAL
WBC # BLD: 8.4 K/UL (ref 3.5–11.3)
WBC # BLD: ABNORMAL 10*3/UL
WBC UA: ABNORMAL /HPF (ref 0–5)
YEAST: ABNORMAL

## 2021-09-11 PROCEDURE — 6360000002 HC RX W HCPCS: Performed by: STUDENT IN AN ORGANIZED HEALTH CARE EDUCATION/TRAINING PROGRAM

## 2021-09-11 PROCEDURE — 6370000000 HC RX 637 (ALT 250 FOR IP): Performed by: STUDENT IN AN ORGANIZED HEALTH CARE EDUCATION/TRAINING PROGRAM

## 2021-09-11 PROCEDURE — 2500000003 HC RX 250 WO HCPCS: Performed by: STUDENT IN AN ORGANIZED HEALTH CARE EDUCATION/TRAINING PROGRAM

## 2021-09-11 PROCEDURE — 70450 CT HEAD/BRAIN W/O DYE: CPT

## 2021-09-11 PROCEDURE — 87086 URINE CULTURE/COLONY COUNT: CPT

## 2021-09-11 PROCEDURE — 85055 RETICULATED PLATELET ASSAY: CPT

## 2021-09-11 PROCEDURE — 96372 THER/PROPH/DIAG INJ SC/IM: CPT

## 2021-09-11 PROCEDURE — 2580000003 HC RX 258: Performed by: STUDENT IN AN ORGANIZED HEALTH CARE EDUCATION/TRAINING PROGRAM

## 2021-09-11 PROCEDURE — 96374 THER/PROPH/DIAG INJ IV PUSH: CPT

## 2021-09-11 PROCEDURE — 80053 COMPREHEN METABOLIC PANEL: CPT

## 2021-09-11 PROCEDURE — 85025 COMPLETE CBC W/AUTO DIFF WBC: CPT

## 2021-09-11 PROCEDURE — 96375 TX/PRO/DX INJ NEW DRUG ADDON: CPT

## 2021-09-11 PROCEDURE — 81001 URINALYSIS AUTO W/SCOPE: CPT

## 2021-09-11 PROCEDURE — 82010 KETONE BODYS QUAN: CPT

## 2021-09-11 PROCEDURE — 96361 HYDRATE IV INFUSION ADD-ON: CPT

## 2021-09-11 PROCEDURE — 99285 EMERGENCY DEPT VISIT HI MDM: CPT

## 2021-09-11 RX ORDER — ONDANSETRON 4 MG/1
4 TABLET, FILM COATED ORAL EVERY 8 HOURS PRN
Qty: 12 TABLET | Refills: 0 | Status: SHIPPED | OUTPATIENT
Start: 2021-09-11 | End: 2021-09-14 | Stop reason: HOSPADM

## 2021-09-11 RX ORDER — FAMOTIDINE 20 MG/1
20 TABLET, FILM COATED ORAL ONCE
Status: DISCONTINUED | OUTPATIENT
Start: 2021-09-11 | End: 2021-09-11

## 2021-09-11 RX ORDER — DIMENHYDRINATE 50 MG/1
50 TABLET ORAL ONCE
Status: COMPLETED | OUTPATIENT
Start: 2021-09-11 | End: 2021-09-11

## 2021-09-11 RX ORDER — SODIUM CHLORIDE, SODIUM LACTATE, POTASSIUM CHLORIDE, CALCIUM CHLORIDE 600; 310; 30; 20 MG/100ML; MG/100ML; MG/100ML; MG/100ML
1000 INJECTION, SOLUTION INTRAVENOUS ONCE
Status: COMPLETED | OUTPATIENT
Start: 2021-09-11 | End: 2021-09-11

## 2021-09-11 RX ORDER — SODIUM CHLORIDE, SODIUM LACTATE, POTASSIUM CHLORIDE, AND CALCIUM CHLORIDE .6; .31; .03; .02 G/100ML; G/100ML; G/100ML; G/100ML
1000 INJECTION, SOLUTION INTRAVENOUS ONCE
Status: COMPLETED | OUTPATIENT
Start: 2021-09-11 | End: 2021-09-11

## 2021-09-11 RX ORDER — PROMETHAZINE HYDROCHLORIDE 25 MG/ML
25 INJECTION, SOLUTION INTRAMUSCULAR; INTRAVENOUS ONCE
Status: COMPLETED | OUTPATIENT
Start: 2021-09-11 | End: 2021-09-11

## 2021-09-11 RX ORDER — ONDANSETRON 2 MG/ML
4 INJECTION INTRAMUSCULAR; INTRAVENOUS ONCE
Status: COMPLETED | OUTPATIENT
Start: 2021-09-11 | End: 2021-09-11

## 2021-09-11 RX ORDER — PROMETHAZINE HYDROCHLORIDE 25 MG/ML
12.5 INJECTION, SOLUTION INTRAMUSCULAR; INTRAVENOUS ONCE
Status: DISCONTINUED | OUTPATIENT
Start: 2021-09-11 | End: 2021-09-11

## 2021-09-11 RX ORDER — MECLIZINE HCL 12.5 MG/1
12.5 TABLET ORAL ONCE
Status: COMPLETED | OUTPATIENT
Start: 2021-09-11 | End: 2021-09-11

## 2021-09-11 RX ADMIN — SODIUM CHLORIDE, POTASSIUM CHLORIDE, SODIUM LACTATE AND CALCIUM CHLORIDE 1000 ML: 600; 310; 30; 20 INJECTION, SOLUTION INTRAVENOUS at 15:07

## 2021-09-11 RX ADMIN — SODIUM CHLORIDE, POTASSIUM CHLORIDE, SODIUM LACTATE AND CALCIUM CHLORIDE 1000 ML: 600; 310; 30; 20 INJECTION, SOLUTION INTRAVENOUS at 16:40

## 2021-09-11 RX ADMIN — PROMETHAZINE HYDROCHLORIDE 25 MG: 25 INJECTION INTRAMUSCULAR; INTRAVENOUS at 16:42

## 2021-09-11 RX ADMIN — FAMOTIDINE 20 MG: 10 INJECTION, SOLUTION INTRAVENOUS at 16:42

## 2021-09-11 RX ADMIN — PYRIDOXINE HYDROCHLORIDE 50 MG: 100 INJECTION, SOLUTION INTRAMUSCULAR; INTRAVENOUS at 16:42

## 2021-09-11 RX ADMIN — DIMENHYDRINATE 50 MG: 50 TABLET ORAL at 18:45

## 2021-09-11 RX ADMIN — SODIUM CHLORIDE, POTASSIUM CHLORIDE, SODIUM LACTATE AND CALCIUM CHLORIDE 1000 ML: 600; 310; 30; 20 INJECTION, SOLUTION INTRAVENOUS at 18:47

## 2021-09-11 RX ADMIN — MECLIZINE HCL 12.5 MG 12.5 MG: 12.5 TABLET ORAL at 23:39

## 2021-09-11 RX ADMIN — ONDANSETRON 4 MG: 2 INJECTION INTRAMUSCULAR; INTRAVENOUS at 15:08

## 2021-09-11 ASSESSMENT — ENCOUNTER SYMPTOMS
RHINORRHEA: 0
DIARRHEA: 0
CONSTIPATION: 0
VOMITING: 1
SORE THROAT: 0
EYE PAIN: 0
NAUSEA: 1
COUGH: 0
ABDOMINAL PAIN: 0
SHORTNESS OF BREATH: 0

## 2021-09-11 NOTE — ED TRIAGE NOTES
Pt presents to ED from home c/o nausea and vomiting during pregnancy, ongoing since she first found out she was pregnant. Pt is 10 weeks pregnant @ this time, 3rd pregnancy. Pt denies LOC, c/p, diarrhea, burning with urination, vaginal bleeding or pain. Pt is A&Ox4, placed on monitor, IV established. Pt is given blankets, changed into gown.

## 2021-09-11 NOTE — ED PROVIDER NOTES
[address] HANDOFF       Handoff taken on the following patient from prior Attending Physician:  Pt Name: Barrington Walton  PCP:  Uzma James NP-C, APRN - NP    Attestation  I was available and discussed any additional care issues that arose and coordinated the management plans with the resident(s) caring for the patient during my duty period. Any areas of disagreement with resident's documentation of care or procedures are noted on the chart. I was personally present for the key portions of any/all procedures during my duty period. I have documented in the chart those procedures where I was not present during the key portions.              Chase Marino MD  09/11/21 4485

## 2021-09-11 NOTE — ED PROVIDER NOTES
[address]  ED  Emergency Department  Emergency Medicine Resident Sign-out     Care of Ofe Collins was assumed from Dr. Robert Carpenter and is being seen for Emesis During Pregnancy (10 weeks)  . The patient's initial evaluation and plan have been discussed with the prior provider who initially evaluated the patient. EMERGENCY DEPARTMENT COURSE / MEDICAL DECISION MAKING:       MEDICATIONS GIVEN:  Orders Placed This Encounter   Medications    lactated ringers infusion 1,000 mL    ondansetron (ZOFRAN) injection 4 mg    DISCONTD: famotidine (PEPCID) tablet 20 mg    famotidine (PEPCID) injection 20 mg    pyridoxine (B-6) injection 50 mg    lactated ringers bolus    DISCONTD: promethazine (PHENERGAN) injection 12.5 mg    dimenhyDRINATE (DRAMAMINE) tablet 50 mg    promethazine (PHENERGAN) injection 25 mg    lactated ringers infusion 1,000 mL    ondansetron (ZOFRAN) 4 MG tablet     Sig: Take 1 tablet by mouth every 8 hours as needed for Nausea     Dispense:  12 tablet     Refill:  0       LABS / RADIOLOGY:     Labs Reviewed   CBC WITH AUTO DIFFERENTIAL - Abnormal; Notable for the following components:       Result Value    Hemoglobin 11.8 (*)     Seg Neutrophils 87 (*)     Lymphocytes 9 (*)     Monocytes 2 (*)     Eosinophils % 0 (*)     Immature Granulocytes 1 (*)     Absolute Lymph # 0.79 (*)     All other components within normal limits   COMPREHENSIVE METABOLIC PANEL W/ REFLEX TO MG FOR LOW K - Abnormal; Notable for the following components:    Glucose 110 (*)     Potassium 3.6 (*)     CO2 17 (*)     Anion Gap 19 (*)     Total Bilirubin 0.26 (*)     All other components within normal limits   URINALYSIS WITH MICROSCOPIC - Abnormal; Notable for the following components:    Ketones, Urine LARGE (*)     All other components within normal limits   CULTURE, URINE   IMMATURE PLATELET FRACTION       No results found.     RECENT VITALS:     Temp: 98.4 °F (36.9 °C),  Pulse: 91, Resp: 18, BP: 102/63, SpO2: 100 %      This patient is a 31 y.o. Female with n/v 11weeks, dizziness, no abdominal pain, no concern for ectopic, bedside US fetal rate 162, fluids, likely discharge       ED Course as of Sep 11 2332   Sat Sep 11, 2021   1604 Re-evaluated and patient is continuing to have sensation of spinning. Has not been able to pee for us. Patient does state that she has not peed since yesterday due to decreased oral intake. [MA]   1606 Phenergan and dramamine ordered    [MA]   2224 Patient now states that she is dizzy unable to walk in a straight line, has ataxia with nystagmus that is not easily fatigable. [PS]   0956 CT head and neuro consult ordered  ---plan for admission to ETU vs medicine vs Neuro    [PS]      ED Course User Index  [MA] Beto Al DO  [PS] Laury Chavez MD       OUTSTANDING TASKS / RECOMMENDATIONS:    1. UA pending - negative  2. Fluids - done   3. Dizziness w/ nystagmus  4. Neuro consult  5. Ct head neg  6.  ETU admission      FINAL IMPRESSION:     1. Nausea        DISPOSITION:         DISPOSITION:  []  Discharge   []  Transfer -    [x]  Admission -   ETU   []  Against Medical Advice   []  Eloped   FOLLOW-UP: Joan De La O, APRN - NP  [address] [facility]  [phone]    In 3 days      [facility]  [address] [facility] [address]  [phone]    As needed, If symptoms worsen    Giancarlo Zavala DO  [address]  [phone]      Make an appointment soon as possible     DISCHARGE MEDICATIONS: New Prescriptions    ONDANSETRON (ZOFRAN) 4 MG TABLET    Take 1 tablet by mouth every 8 hours as needed for Nausea          Laury Chavez MD  Emergency Medicine Resident  [address] [facility]       Laury Chavez MD  Resident  09/11/21 5077       Laury Chavez MD  Resident  09/11/21 6999

## 2021-09-11 NOTE — ED NOTES
Bed: 27  Expected date:   Expected time:   Means of arrival:   Comments:  SWETHA Redding RN  09/11/21 8338

## 2021-09-11 NOTE — ED PROVIDER NOTES
[facility]     Emergency Department     Faculty Attestation    I performed a history and physical examination of the patient and discussed management with the resident. I have reviewed and agree with the residents findings including all diagnostic interpretations, and treatment plans as written at the time of my review. Any areas of disagreement are noted on the chart. I was personally present for the key portions of any procedures. I have documented in the chart those procedures where I was not present during the key portions. For Physician Assistant/ Nurse Practitioner cases/documentation I have personally evaluated this patient and have completed at least one if not all key elements of the E/M (history, physical exam, and MDM). Additional findings are as noted. This patient was evaluated in the Emergency Department for symptoms described in the history of present illness. The patient was evaluated in the context of the global COVID-19 pandemic, which necessitated consideration that the patient might be at risk for infection with the SARS-CoV-2 virus that causes COVID-19. Institutional protocols and algorithms that pertain to the evaluation of patients at risk for COVID-19 are in a state of rapid change based on information released by regulatory bodies including the CDC and federal and state organizations. These policies and algorithms were followed during the patient's care in the ED. Primary Care Physician: Nicko COWAN, APRN - NP    History: This is a 31 y.o. female who presents to the Emergency Department with complaint of nausea and vomiting. The patient approximate 11weeks gestational age. Patient states she is been having nausea and vomiting since being of her pregnancy. The patient states since Wednesday has been significantly worse. She says she does feel dizzy. She denies any abdominal pain.   Denies any dysuria, frequency urgency. Physical:   weight is 235 lb (106.6 kg). Her oral temperature is 98.4 °F (36.9 °C). Her blood pressure is 102/63 and her pulse is 91. Her respiration is 18 and oxygen saturation is 100%. Awake alert nontoxic no acute stress. Heart has a regular rate, abdomen is soft nontender    Impression: Hyperemesis gravidarum    Plan: IV fluid, BMP, urinalysis, antiemetic, bedside ultrasound and reassess      (Please note that portions of this note were completed with a voice recognition program.  Efforts were made to edit the dictations but occasionally words are mis-transcribed.)    Evgeny Madrigal MD, [facility]  Attending Emergency Medicine Physician        Paola Egan MD  09/11/21 7704

## 2021-09-11 NOTE — ED PROVIDER NOTES
[address]  ED  Emergency Department Encounter  Emergency Medicine Resident     Pt Name: Ese Cr  MRN: [id number]  [place] 1995  Date of evaluation: 21  PCP:  Fredy COWAN, BECKY - ANTONY    CHIEF COMPLAINT       Chief Complaint   Patient presents with    Emesis During Pregnancy     10 weeks       HISTORY OFPRESENT ILLNESS  (Location/Symptom, Timing/Onset, Context/Setting, Quality, Duration, Modifying Factors,Severity.)      Ese Cr is a 31 y.o. female a G3, P2 with 1 previous  who presents at 6 weeks gestation with 3 days of worsening nausea, vomiting generally feeling unwell. Patient has not been able to tolerate oral intake over the past 3 days she states. She denies any vaginal bleeding denies any vaginal discharge. She denies any abdominal pain. Does state that she has been somewhat dizzy although denies any headaches or blurred vision. Denies any right upper quadrant pain. Denies any pain with urination. Denies any swelling in her legs. Patient states that she did not have any previous issues with hypertension in pregnancy was prediabetic in her last pregnancy although was never on any insulin. Patient believes that she is dehydrated due to inability to tolerate oral intake over the past few days. She denies any current pain. Denies any cramping or contractions. Denies any trauma to the abdomen. PAST MEDICAL / SURGICAL / SOCIAL / FAMILY HISTORY      has no past medical history on file. has no past surgical history on file. Social:  reports that she has never smoked. She has never used smokeless tobacco. She reports that she does not drink alcohol and does not use drugs. Family Hx: History reviewed. No pertinent family history. Allergies:  Patient has no known allergies.     Home Medications:  Prior to Admission medications    Not on File       REVIEW OFSYSTEMS    (2-9 systems for level 4, 10 or more for level 5)      Review of Systems   Constitutional: Negative for activity change, chills and fever. HENT: Negative for congestion, rhinorrhea and sore throat. Eyes: Negative for pain and visual disturbance. Respiratory: Negative for cough and shortness of breath. Cardiovascular: Negative for chest pain and palpitations. Gastrointestinal: Positive for nausea and vomiting. Negative for abdominal pain, constipation and diarrhea. Genitourinary: Negative for difficulty urinating and frequency. Musculoskeletal: Negative for arthralgias and myalgias. Skin: Negative for rash and wound. Neurological: Positive for dizziness. Negative for headaches. PHYSICAL EXAM   (up to 7 for level 4, 8 or more forlevel 5)      INITIAL VITALS:   Vitals:    09/11/21 1431   BP: 102/63   Pulse:    Resp:    Temp:    SpO2:         Physical Exam  Vitals and nursing note reviewed. Constitutional:       General: She is not in acute distress. Appearance: Normal appearance. She is not ill-appearing, toxic-appearing or diaphoretic. Comments: pleasent, conversational, in no acute distress, although does appear to feel unwell. HENT:      Head: Normocephalic and atraumatic. Nose: Nose normal.      Mouth/Throat:      Mouth: Mucous membranes are moist.      Pharynx: Oropharynx is clear. No oropharyngeal exudate or posterior oropharyngeal erythema. Eyes:      General:         Right eye: No discharge. Left eye: No discharge. Extraocular Movements: Extraocular movements intact. Pupils: Pupils are equal, round, and reactive to light. Neck:      Comments: Moving neck freely upon my examination  Cardiovascular:      Rate and Rhythm: Normal rate and regular rhythm. Pulses: Normal pulses. Comments: Radial pulse and dorsalis pedis pulses intact and equal bilaterally. Pulmonary:      Effort: Pulmonary effort is normal. No respiratory distress. Breath sounds: Normal breath sounds. No wheezing, rhonchi or rales. Abdominal:      General: There is no distension. Palpations: Abdomen is soft. Tenderness: There is no abdominal tenderness. There is no guarding or rebound. Comments: No tenderness to palpation of the abdomen, no right upper quadrant pain, liver edge is nonpalpable   Musculoskeletal:      Cervical back: Normal range of motion. No rigidity. Right lower leg: No edema. Left lower leg: No edema. Comments: No clonus noted, no tenderness palpation of bilateral calves, no edema no swelling noted. Dorsiflexion plantarflexion intact and equal bilateral lower extremities. Skin:     General: Skin is warm and dry. Capillary Refill: Capillary refill takes less than 2 seconds. Neurological:      General: No focal deficit present. Mental Status: She is alert and oriented to person, place, and time. Psychiatric:         Mood and Affect: Mood normal.         Thought Content: Thought content normal.         DIFFERENTIAL  DIAGNOSIS       Initial MDM/Plan: 31 y.o. female who presents with nausea and vomiting of pregnancy. From initial examination patient is resting in the cot, does have vomit in the bucket next to her although is in no acute distress, no respiratory distress, speaking in full sentences. Signs upon arrival are within normal limits. We will obtain CBC to evaluate for acute anemia versus elevated white blood cell count, CMP evaluate electrolytes as well as hepatic function. Urinalysis to rule out urinary tract infection. Symptomatic relief plan with IV fluids, Zofran, Pepcid. Patient continued to have nausea and vomiting. Phenergan IM ordered. Dramamine ordered for feelings of motion sickness. Has not been able to urinate for us yet. Bedside ultrasound performed with good fetal movement, fetal heart rate of 162.      Patient feels somewhat better although not back to normal.    Signed out to  ending receiving Dramamine, pending urinalysis, pending final disposition    DIAGNOSTIC RESULTS / EMERGENCYDEPARTMENT COURSE / MDM     LABS:  Labs Reviewed   CBC WITH AUTO DIFFERENTIAL - Abnormal; Notable for the following components:       Result Value    Hemoglobin 11.8 (*)     Seg Neutrophils 87 (*)     Lymphocytes 9 (*)     Monocytes 2 (*)     Eosinophils % 0 (*)     Immature Granulocytes 1 (*)     Absolute Lymph # 0.79 (*)     All other components within normal limits   COMPREHENSIVE METABOLIC PANEL W/ REFLEX TO MG FOR LOW K - Abnormal; Notable for the following components:    Glucose 110 (*)     Potassium 3.6 (*)     CO2 17 (*)     Anion Gap 19 (*)     Total Bilirubin 0.26 (*)     All other components within normal limits   CULTURE, URINE   IMMATURE PLATELET FRACTION   URINALYSIS WITH MICROSCOPIC             EMERGENCY DEPARTMENT COURSE:  ED Course as of Sep 11 1759   Sat Sep 11, 2021   1604 Re-evaluated and patient is continuing to have sensation of spinning. Has not been able to pee for us. Patient does state that she has not peed since yesterday due to decreased oral intake. [MA]   1606 Phenergan and dramamine ordered    [MA]      ED Course User Index  [MA] Veronica Branch DO          PROCEDURES:  None    CONSULTS:  None      FINAL IMPRESSION      1.  Nausea          DISPOSITION / PLAN     DISPOSITION        PATIENT REFERRED TO:  BECKY Huntley - NP  [address] [facility] [address] [phone]    In 3 days      [facility] ED  [address] [facility] [address]  [phone]    As needed, If symptoms worsen    Nico Lopez DO  [address]  [phone]      Make an appointment soon as possible      DISCHARGE MEDICATIONS:  New Prescriptions    No medications on file       Veronica Branch DO  Emergency Medicine Resident    (Please note that portions of this note were completed with a voice recognition program.Efforts were made to edit the dictations but occasionally words are mis-transcribed.)       Katia Dangelo DO  Resident  09/11/21 1978

## 2021-09-12 ENCOUNTER — APPOINTMENT (OUTPATIENT)
Dept: MRI IMAGING | Age: 26
DRG: 833 | End: 2021-09-12
Payer: COMMERCIAL

## 2021-09-12 PROBLEM — R11.2 NAUSEA AND VOMITING: Status: ACTIVE | Noted: 2021-09-12

## 2021-09-12 PROBLEM — Z34.90 PREGNANCY: Status: ACTIVE | Noted: 2021-09-12

## 2021-09-12 LAB
ANION GAP SERPL CALCULATED.3IONS-SCNC: 12 MMOL/L (ref 9–17)
BETA-HYDROXYBUTYRATE: 1.53 MMOL/L (ref 0.02–0.27)
BUN BLDV-MCNC: 6 MG/DL (ref 6–20)
BUN/CREAT BLD: ABNORMAL (ref 9–20)
CALCIUM SERPL-MCNC: 8.6 MG/DL (ref 8.6–10.4)
CHLORIDE BLD-SCNC: 103 MMOL/L (ref 98–107)
CO2: 19 MMOL/L (ref 20–31)
CREAT SERPL-MCNC: 0.6 MG/DL (ref 0.5–0.9)
CULTURE: NORMAL
GFR AFRICAN AMERICAN: >60 ML/MIN
GFR NON-AFRICAN AMERICAN: >60 ML/MIN
GFR SERPL CREATININE-BSD FRML MDRD: ABNORMAL ML/MIN/{1.73_M2}
GFR SERPL CREATININE-BSD FRML MDRD: ABNORMAL ML/MIN/{1.73_M2}
GLUCOSE BLD-MCNC: 91 MG/DL (ref 70–99)
Lab: NORMAL
POTASSIUM SERPL-SCNC: 3.8 MMOL/L (ref 3.7–5.3)
SARS-COV-2, RAPID: NOT DETECTED
SODIUM BLD-SCNC: 134 MMOL/L (ref 135–144)
SPECIMEN DESCRIPTION: NORMAL
SPECIMEN DESCRIPTION: NORMAL

## 2021-09-12 PROCEDURE — 70551 MRI BRAIN STEM W/O DYE: CPT

## 2021-09-12 PROCEDURE — 87635 SARS-COV-2 COVID-19 AMP PRB: CPT

## 2021-09-12 PROCEDURE — 99222 1ST HOSP IP/OBS MODERATE 55: CPT | Performed by: PSYCHIATRY & NEUROLOGY

## 2021-09-12 PROCEDURE — G0378 HOSPITAL OBSERVATION PER HR: HCPCS

## 2021-09-12 PROCEDURE — 6360000002 HC RX W HCPCS: Performed by: STUDENT IN AN ORGANIZED HEALTH CARE EDUCATION/TRAINING PROGRAM

## 2021-09-12 PROCEDURE — 70544 MR ANGIOGRAPHY HEAD W/O DYE: CPT

## 2021-09-12 PROCEDURE — 80048 BASIC METABOLIC PNL TOTAL CA: CPT

## 2021-09-12 PROCEDURE — 94760 N-INVAS EAR/PLS OXIMETRY 1: CPT

## 2021-09-12 PROCEDURE — 96372 THER/PROPH/DIAG INJ SC/IM: CPT

## 2021-09-12 PROCEDURE — 2580000003 HC RX 258: Performed by: PSYCHIATRY & NEUROLOGY

## 2021-09-12 PROCEDURE — 6370000000 HC RX 637 (ALT 250 FOR IP): Performed by: STUDENT IN AN ORGANIZED HEALTH CARE EDUCATION/TRAINING PROGRAM

## 2021-09-12 PROCEDURE — 36415 COLL VENOUS BLD VENIPUNCTURE: CPT

## 2021-09-12 PROCEDURE — 96376 TX/PRO/DX INJ SAME DRUG ADON: CPT

## 2021-09-12 PROCEDURE — 96361 HYDRATE IV INFUSION ADD-ON: CPT

## 2021-09-12 PROCEDURE — 2580000003 HC RX 258: Performed by: STUDENT IN AN ORGANIZED HEALTH CARE EDUCATION/TRAINING PROGRAM

## 2021-09-12 RX ORDER — SODIUM CHLORIDE 0.9 % (FLUSH) 0.9 %
5-40 SYRINGE (ML) INJECTION PRN
Status: DISCONTINUED | OUTPATIENT
Start: 2021-09-12 | End: 2021-09-14 | Stop reason: HOSPADM

## 2021-09-12 RX ORDER — ACETAMINOPHEN 325 MG/1
650 TABLET ORAL EVERY 4 HOURS PRN
Status: DISCONTINUED | OUTPATIENT
Start: 2021-09-12 | End: 2021-09-14 | Stop reason: HOSPADM

## 2021-09-12 RX ORDER — SODIUM CHLORIDE 0.9 % (FLUSH) 0.9 %
5-40 SYRINGE (ML) INJECTION EVERY 12 HOURS SCHEDULED
Status: DISCONTINUED | OUTPATIENT
Start: 2021-09-12 | End: 2021-09-14 | Stop reason: HOSPADM

## 2021-09-12 RX ORDER — SODIUM CHLORIDE 9 MG/ML
INJECTION, SOLUTION INTRAVENOUS CONTINUOUS
Status: DISCONTINUED | OUTPATIENT
Start: 2021-09-12 | End: 2021-09-14 | Stop reason: HOSPADM

## 2021-09-12 RX ORDER — ONDANSETRON 2 MG/ML
4 INJECTION INTRAMUSCULAR; INTRAVENOUS EVERY 6 HOURS PRN
Status: DISCONTINUED | OUTPATIENT
Start: 2021-09-12 | End: 2021-09-14 | Stop reason: HOSPADM

## 2021-09-12 RX ORDER — SODIUM CHLORIDE 9 MG/ML
25 INJECTION, SOLUTION INTRAVENOUS PRN
Status: DISCONTINUED | OUTPATIENT
Start: 2021-09-12 | End: 2021-09-14 | Stop reason: HOSPADM

## 2021-09-12 RX ORDER — ONDANSETRON 4 MG/1
4 TABLET, ORALLY DISINTEGRATING ORAL EVERY 8 HOURS PRN
Status: DISCONTINUED | OUTPATIENT
Start: 2021-09-12 | End: 2021-09-14 | Stop reason: HOSPADM

## 2021-09-12 RX ADMIN — ONDANSETRON 4 MG: 4 TABLET, ORALLY DISINTEGRATING ORAL at 13:17

## 2021-09-12 RX ADMIN — PYRIDOXINE HYDROCHLORIDE 50 MG: 100 INJECTION, SOLUTION INTRAMUSCULAR; INTRAVENOUS at 10:21

## 2021-09-12 RX ADMIN — ENOXAPARIN SODIUM 40 MG: 40 INJECTION SUBCUTANEOUS at 10:25

## 2021-09-12 RX ADMIN — SODIUM CHLORIDE: 9 INJECTION, SOLUTION INTRAVENOUS at 11:04

## 2021-09-12 RX ADMIN — SODIUM CHLORIDE, PRESERVATIVE FREE 10 ML: 5 INJECTION INTRAVENOUS at 10:26

## 2021-09-12 ASSESSMENT — PAIN SCALES - GENERAL
PAINLEVEL_OUTOF10: 0
PAINLEVEL_OUTOF10: 0

## 2021-09-12 NOTE — ED NOTES
Patient to be admitted  Verbalizes understanding  Requesting food, verbalizes nausea has gotten better     Wesley Wright, RN  09/12/21 5653

## 2021-09-12 NOTE — ED NOTES
Upon entering room to give patient discharge information patient verbalizes feeling dizzy  Verbalizes this has been going on for the past three days  Dr. Yemi Jenkins informed     Thelma Hardy RN  09/12/21 0973

## 2021-09-12 NOTE — ED NOTES
More fluids infusing  Waiting for urine sample  Patient verbalizes \"on a regular day I usually only urinate twice a day\"  Denies any history of kidney disease     Bella Bloom RN  09/12/21 2959

## 2021-09-12 NOTE — CONSULTS
History reviewed. No pertinent family history. Review of Systems:       Constitutional Negative for fever and chills   HEENT Negative for ear discharge, ear pain, nosebleed   Eyes Negative for photophobia, pain and discharge   Respiratory Negative for hemoptysis and sputum   Cardiovascular Negative for orthopnea, claudication and PND   Gastrointestinal Positive for nausea, vomiting. Negative for abdominal pain, diarrhea, blood in stool   Musculoskeletal Negative for joint pain, negative for myalgia   Skin Negative for rash or itching   hematology Negative for ecchymosis, anemia   Psychiatric Negative for suicidal ideation, anxiety, depression, hallucinations       Physical Exam:   /63   Pulse 91   Temp 98.4 °F (36.9 °C) (Oral)   Resp 18   Wt 235 lb (106.6 kg)   SpO2 100%   BMI 41.63 kg/m²   Temp (24hrs), Av.4 °F (36.9 °C), Min:98.4 °F (36.9 °C), Max:98.4 °F (36.9 °C)        General examination:      General Appearance:  alert, well appearing  HEENT: Normocephalic, atraumatic, moist mucus membranes, horizontal nystagmus  Neck: supple, no carotid bruits, (-) nuchal rigidity  Lungs:  Respirations unlabored, chest wall no deformity, BS normal  Cardiovascular: normal rate, regular rhythm  Abdomen: Soft, nontender, nondistended, normal bowel sounds  Skin: No gross lesions, rashes, bruising or bleeding on exposed skin area  Extremities:  peripheral pulses palpable, clubbing or edema  Psych: normal affect    NEUROLOGIC EXAMINATION    Mental status   Alert and oriented x 3; following all commands;   speech is fluent, no dysarthria, aphasia. Cranial nerves   II - visual fields intact to confrontation; pupils reactive  III, IV, VI - extraocular muscles intact;   Nystagmus present; no ptosis   V - normal facial sensation                                                               VII - normal facial symmetry                                                             VIII - intact hearing IX, X - symmetrical palate elevation                                               XI - symmetrical shoulder shrug                                                       XII - midline tongue without atrophy or fasciculation     Motor function  Strength:   5/5 RUE, 5/5 RLE  5/5 LUE, 5/5  LLE  Normal bulk and tone. Sensory function Intact to touch, pin, vibration, proprioception throughout     Cerebellar Intact finger-nose-finger testing. Intact heel-shin testing. No dysdiadochokinesia present. No tremors                        Reflex function 2/4 symmetric throughout . Downgoing plantar response bilaterally. (-)Vincent's sign bilaterally      Gait                  Reported history of gait imbalance. Did not test gait secondary to dizziness today. Diagnostics:      Laboratory Testing:  CBC:   Recent Labs     09/09/21 2221 09/11/21  1522   WBC 12.3* 8.4   HGB 12.8 11.8*    See Reflexed IPF Result     BMP:    Recent Labs     09/09/21 2221 09/11/21  1522    135   K 4.0 3.6*    99   CO2 21 17*   BUN 7 7   CREATININE 0.54 0.67   GLUCOSE 97 110*         Lab Results   Component Value Date    ALT 12 09/11/2021    AST 13 09/11/2021    TSH 0.66 08/28/2018    LABA1C 5.2 08/28/2018       No results found for: PHENYTOIN, PHENYTOIN, VALPROATE, CBMZ      Imaging/Diagnostics:    CT head ON 9/11/21  Ordered by the ED. No acute intracranial abnormality.  MRI may be obtained if clinically  indicated      Assessment and plan:     -Vestibular neuritis/ BPPV. Unlikely to be wallenberg syndrome  -Hyperemesis gravidarum  -Fasting ketosis? Recommend checking BHB in serum.     CT head obtained by ED - Reviewed (minimize radiation in the future, patient is first trimester)   Discuss steroids in the am.    Symptomatic treatment with antiemetics, antihistamines   No symptom resolution within 48 hours, can plan for MRI   Will discuss need for vestibular rehab   Recommend fluids based on volume status   Replace electrolytes   Further recommendations to follow in the am    Electronically signed by Ora Benítez MD on 9/12/2021 at 1:46 AM      Ora Benítez MD   PGY 3 Neurology Resident  9/12/2021 at 1:46 AM

## 2021-09-12 NOTE — PROGRESS NOTES
[address]  CDU / OBSERVATION ENCOUNTER  ATTENDING NOTE       I performed a history and physical examination of the patient and discussed management with the resident or midlevel provider. I reviewed the resident or midlevel provider's note and agree with the documented findings and plan of care. Any areas of disagreement are noted on the chart. I was personally present for the key portions of any procedures. I have documented in the chart those procedures where I was not present during the key portions. I have reviewed the nurses notes. I agree with the chief complaint, past medical history, past surgical history, allergies, medications, social and family history as documented unless otherwise noted below. The Family history, social history, and ROS are effectively unchanged since admission unless noted elsewhere in the chart. Patient has history of hyperemesis during pregnancy. Patient is approximately 10 weeks along. Patient presents also with dizziness nausea and vomiting. Patient is significantly worse over the past week. Patient was treated in the office earlier this week on Wednesday. Patient has nystagmus and inability to walk because of the symptoms. Patient had neurology consult in ED as a result. Patient was seen by neurology in the ED. Vestibular neuritis and BPPV were speculated. Patient had recommendations for fluids and electrolyte replacement. Patient will have possible MRI and steroids depending on clinical outcome this morning. Patient had large ketones in urine. Patient has been treated with Phenergan and Dramamine. Given IV rehydration in ED. Patient denying any abdominal pain, vaginal bleeding or cramping.     Garland Beltran MD  Attending Emergency  Physician

## 2021-09-12 NOTE — ED NOTES
The following labs labeled with pt sticker and tubed to lab:     [] Blue     [] Lavender   [] on ice  [] Green/yellow  [] Green/black [] on ice  [] Yellow  [] Red  [] Pink      [x] COVID-19 swab    [x] Rapid  [] PCR  [] Peds Viral Panel     [] Urine Sample  [] Pelvic Cultures  [] Blood Cultures            Larwance Mcburney, RN  09/12/21 3176

## 2021-09-12 NOTE — H&P
[address] [facility]  CDU / OBSERVATION eNCOUnter  Resident Note     Pt Name: Mirza Duarte  MRN: [id number]  [place] 1995  Date of evaluation: 21  Patient's PCP is :  Vincent NIC, BECKY - NP    CHIEF COMPLAINT       Chief Complaint   Patient presents with    Emesis During Pregnancy     10 weeks         HISTORY OF PRESENT ILLNESS   Mirza Duarte is a 31 y.o. female a G3, P2 with 1 previous  who presents at 6 weeks gestation with 3 days of worsening nausea, vomiting generally feeling unwell. Patient has not been able to tolerate oral intake over the past 3 days she states. She denies any vaginal bleeding denies any vaginal discharge. She denies any abdominal pain. Does state that she has been somewhat dizzy although denies any headaches or blurred vision. Denies any right upper quadrant pain. Denies any pain with urination. Denies any swelling in her legs. Patient states that she did not have any previous issues with hypertension in pregnancy was prediabetic in her last pregnancy although was never on any insulin. Patient believes that she is dehydrated due to inability to tolerate oral intake over the past few days. She denies any current pain. Denies any cramping or contractions. Denies any trauma to the abdomen.     Location/Symptom: n/v/dizziness  Timing/Onset: 3 days  Provocation: unknown  Quality: n/a  Radiation: none  Severity: 8/10  Timing/Duration: intermittent  Modifying Factors: dizziness worse while laying on left side    REVIEW OF SYSTEMS       General ROS - No fevers, No malaise   Ophthalmic ROS - No discharge, No changes in vision  ENT ROS -  No sore throat, No rhinorrhea,   Respiratory ROS - no shortness of breath, no cough, no  wheezing  Cardiovascular ROS - No chest pain, no dyspnea on exertion  Gastrointestinal ROS - +nausea and vomiting No abdominal pain, no change in bowel habits, no black or bloody stools  Genito-Urinary ROS - No dysuria, trouble voiding, or hematuria  Musculoskeletal ROS - No myalgias, No arthalgias  Neurological ROS - +dizziness No headache,lightheadedness, No focal weakness, no loss of sensation  Dermatological ROS - No lesions, No rash     (PQRS) Advance directives on face sheet per hospital policy. No change unless specifically mentioned in chart    PAST MEDICAL HISTORY    has no past medical history on file. I have reviewed the past medical history with the patient and it is pertinent to this complaint. SURGICAL HISTORY      has no past surgical history on file. I have reviewed and agree with Surgical History entered and it is pertinent to this complaint. CURRENT MEDICATIONS     sodium chloride flush 0.9 % injection 5-40 mL, 2 times per day  sodium chloride flush 0.9 % injection 5-40 mL, PRN  0.9 % sodium chloride infusion, PRN  enoxaparin (LOVENOX) injection 40 mg, Daily  acetaminophen (TYLENOL) tablet 650 mg, Q4H PRN  ondansetron (ZOFRAN-ODT) disintegrating tablet 4 mg, Q8H PRN   Or  ondansetron (ZOFRAN) injection 4 mg, Q6H PRN  pyridoxine (B-6) injection 50 mg, Daily        All medication charted and reviewed. ALLERGIES     has No Known Allergies. FAMILY HISTORY     has no family status information on file. family history is not on file. The patient denies any pertinent family history. I have reviewed and agree with the family history entered. I have reviewed the Family History and it is not significant to the case    SOCIAL HISTORY      reports that she has never smoked. She has never used smokeless tobacco. She reports that she does not drink alcohol and does not use drugs. I have reviewed and agree with all Social.  There are no concerns for substance abuse/use. PHYSICAL EXAM     INITIAL VITALS:  weight is 235 lb (106.6 kg). Her oral temperature is 98.4 °F (36.9 °C). Her blood pressure is 116/72 and her pulse is 76. Her respiration is 18 and oxygen saturation is 99%.       CONSTITUTIONAL: AOx4, no apparent distress, appears stated age    HEAD: normocephalic, atraumatic   EYES: PERRLA, EOMI    ENT: moist mucous membranes, uvula midline   NECK: supple, symmetric   BACK: symmetric   LUNGS: clear to auscultation bilaterally   CARDIOVASCULAR: regular rate and rhythm, no murmurs, rubs or gallops   ABDOMEN: soft, non-tender, non-distended with normal active bowel sounds   NEUROLOGIC:  MAEx4, no focal sensory or motor deficits   MUSCULOSKELETAL: no clubbing, cyanosis or edema   SKIN: no rash or wounds       DIFFERENTIAL DIAGNOSIS/MDM:     BPPV - patients symptoms consistent with BPPV, negative head CT    Hyperemesis gravidarum - nausea/emesis may be related to this    Viral gastritis - unlikely given no abdominal pain    DIAGNOSTIC RESULTS     EKG: All EKG's are interpreted by the Observation Physician who either signs or Co-signs this chart in the absence of a cardiologist.    EKG Interpretation    none    Ngoc Hunt DO        RADIOLOGY:   I directly visualized the following  images and reviewed the radiologist interpretations:    CT Head WO Contrast    Result Date: 9/12/2021  EXAMINATION: CT OF THE HEAD WITHOUT CONTRAST  9/11/2021 11:20 pm TECHNIQUE: CT of the head was performed without the administration of intravenous contrast. Dose modulation, iterative reconstruction, and/or weight based adjustment of the mA/kV was utilized to reduce the radiation dose to as low as reasonably achievable. COMPARISON: None. HISTORY: ORDERING SYSTEM PROVIDED HISTORY: dizziness TECHNOLOGIST PROVIDED HISTORY: dizziness Decision Support Exception - unselect if not a suspected or confirmed emergency medical condition->Emergency Medical Condition (MA) Is the patient pregnant? ->Yes Reason for Exam: dizzy FINDINGS: BRAIN/VENTRICLES: There is no acute intracranial hemorrhage, mass effect or midline shift. No abnormal extra-axial fluid collection. The gray-white differentiation is maintained without evidence of an acute infarct. There is no evidence of hydrocephalus. ORBITS: The visualized portion of the orbits demonstrate no acute abnormality. SINUSES: The visualized paranasal sinuses and mastoid air cells demonstrate no acute abnormality. SOFT TISSUES/SKULL:  No acute abnormality of the visualized skull or soft tissues. No acute intracranial abnormality. MRI may be obtained if clinically indicated       LABS:  I have reviewed and interpreted all available lab results.   Labs Reviewed   CBC WITH AUTO DIFFERENTIAL - Abnormal; Notable for the following components:       Result Value    Hemoglobin 11.8 (*)     Seg Neutrophils 87 (*)     Lymphocytes 9 (*)     Monocytes 2 (*)     Eosinophils % 0 (*)     Immature Granulocytes 1 (*)     Absolute Lymph # 0.79 (*)     All other components within normal limits   COMPREHENSIVE METABOLIC PANEL W/ REFLEX TO MG FOR LOW K - Abnormal; Notable for the following components:    Glucose 110 (*)     Potassium 3.6 (*)     CO2 17 (*)     Anion Gap 19 (*)     Total Bilirubin 0.26 (*)     All other components within normal limits   URINALYSIS WITH MICROSCOPIC - Abnormal; Notable for the following components:    Ketones, Urine LARGE (*)     Bacteria, UA FEW (*)     All other components within normal limits   BETA-HYDROXYBUTYRATE - Abnormal; Notable for the following components:    Beta-Hydroxybutyrate 1.53 (*)     All other components within normal limits   COVID-19, RAPID   CULTURE, URINE   IMMATURE PLATELET FRACTION       SCREENING TOOLS:    none    CDU IMPRESSION / Claudette Lopez is a 31 y.o. female who presents with n/v dizziness    BBPV  -Symptoms of positional dizziness consistent with this  -Neuro may try steroids today  -MRI if symptoms do not resolve in 48 hours  -Fluids, antiemetics, and antihistamines    Hyperemesis gravidarum  -Although needs 5% reduction in weight loss for diagnosis, patient appears to be on extreme spectrum of n/v for pregnancy  -May be confounded by BPPV  -Fluids and antiemetics      · Continue home medications and pain control  · Monitor vitals, labs, and imaging  · DISPO: pending consults and clinical improvement    CONSULTS:    IP CONSULT TO NEUROLOGY    PROCEDURES:  Not indicated       PATIENT REFERRED TO:    BECKY Palma NP  [address] [facility]  [phone]    In 3 days      [facility] ED  [address] [place] [address]  [phone]    As needed, If symptoms worsen    Franky Stuart DO  [address] [facility] [phone]      Make an appointment soon as possible      --  Asa Arredondo DO   Emergency Medicine Resident     This dictation was generated by voice recognition computer software. Although all attempts are made to edit the dictation for accuracy, there may be errors in the transcription that are not intended.

## 2021-09-12 NOTE — ED NOTES
Medicated for ongoing nausea, no vomiting  Resting quietly with eyes closed     Francheska Ortega RN  09/12/21 1282

## 2021-09-12 NOTE — CARE COORDINATION
Case Management Initial Discharge Plan  Margaret Freedman,             Met with:patient to discuss discharge plans. Information verified: address, contacts, phone number, , insurance Yes  Insurance Provider: Mary Gonzalez    Emergency Contact/Next of Kin name & number:  and parent correct on face sheet   Who are involved in patient's support system? Above     PCP: Андрей Ureña NP-C, APRN - NP  Date of last visit: last month      Discharge Planning    Living Arrangements:    lives with  and 2 children    Home has 1 stories  2 stairs to climb to get into front door, 0stairs to climb to reach second floor  Location of bedroom/bathroom in home main    Patient able to perform ADL's:Independent    Current Services (outpatient & in home)   DME equipment:   DME provider:     Is patient receiving oral anticoagulation therapy? No    If indicated:   Physician managing anticoagulation treatment:   Where does patient obtain lab work for ATC treatment? Potential Assistance Needed:       Patient agreeable to home care: No  [place] of choice provided:  no    Prior SNF/Rehab Placement and Facility:   Agreeable to SNF/Rehab: No  [place] of choice provided: no     Evaluation: no    Expected Discharge date:       Patient expects to be discharged to: If home: is the family and/or caregiver wiling & able to provide support at home? Who will be providing this support? *    Follow Up Appointment: Best Day/ Time:      Transportation provider:   Transportation arrangements needed for discharge: No    Readmission Risk              Risk of Unplanned Readmission:  0             Does patient have a readmission risk score greater than 14?: No  If yes, follow-up appointment must be made within 7 days of discharge.      Goals of Care:       Educated pt  on transitional options,  Did not provide freedom of choice and are agreeable with plan      Discharge Plan: home independent follow for any needs has pcp and ride           Electronically signed by Gerardo Thakur RN on 9/12/21 at 3:01 PM EDT

## 2021-09-12 NOTE — ED NOTES
Report given to [facility] RN, all questions answered     Ronan Manrique RN  09/12/21 [id number]

## 2021-09-12 NOTE — ED NOTES
Transferred to [facility] in wheelchair  Tolerated well     Cortney Warren RN  09/12/21 1659

## 2021-09-12 NOTE — ED NOTES
Eyes closed respirations deep et unlabored. Opens eyes when name is called. NAD.   Remains on monitor  Will continue to assess     Herbie Kumar RN  09/12/21 [id number]

## 2021-09-13 PROBLEM — R11.2 NAUSEA AND VOMITING: Status: RESOLVED | Noted: 2021-09-12 | Resolved: 2021-09-13

## 2021-09-13 PROBLEM — R11.0 NAUSEA: Status: ACTIVE | Noted: 2021-09-12

## 2021-09-13 LAB — MAGNESIUM: 1.8 MG/DL (ref 1.6–2.6)

## 2021-09-13 PROCEDURE — G0378 HOSPITAL OBSERVATION PER HR: HCPCS

## 2021-09-13 PROCEDURE — 99233 SBSQ HOSP IP/OBS HIGH 50: CPT | Performed by: STUDENT IN AN ORGANIZED HEALTH CARE EDUCATION/TRAINING PROGRAM

## 2021-09-13 PROCEDURE — 94760 N-INVAS EAR/PLS OXIMETRY 1: CPT

## 2021-09-13 PROCEDURE — 6370000000 HC RX 637 (ALT 250 FOR IP): Performed by: STUDENT IN AN ORGANIZED HEALTH CARE EDUCATION/TRAINING PROGRAM

## 2021-09-13 PROCEDURE — 83735 ASSAY OF MAGNESIUM: CPT

## 2021-09-13 PROCEDURE — 6360000002 HC RX W HCPCS: Performed by: STUDENT IN AN ORGANIZED HEALTH CARE EDUCATION/TRAINING PROGRAM

## 2021-09-13 PROCEDURE — 96361 HYDRATE IV INFUSION ADD-ON: CPT

## 2021-09-13 PROCEDURE — 1200000000 HC SEMI PRIVATE

## 2021-09-13 PROCEDURE — 2580000003 HC RX 258: Performed by: STUDENT IN AN ORGANIZED HEALTH CARE EDUCATION/TRAINING PROGRAM

## 2021-09-13 PROCEDURE — 96376 TX/PRO/DX INJ SAME DRUG ADON: CPT

## 2021-09-13 PROCEDURE — 97162 PT EVAL MOD COMPLEX 30 MIN: CPT

## 2021-09-13 PROCEDURE — 97530 THERAPEUTIC ACTIVITIES: CPT

## 2021-09-13 PROCEDURE — 36415 COLL VENOUS BLD VENIPUNCTURE: CPT

## 2021-09-13 RX ORDER — PREDNISONE 20 MG/1
40 TABLET ORAL DAILY
Status: DISCONTINUED | OUTPATIENT
Start: 2021-09-13 | End: 2021-09-14 | Stop reason: HOSPADM

## 2021-09-13 RX ORDER — PREDNISONE 20 MG/1
40 TABLET ORAL DAILY
Qty: 10 TABLET | Refills: 0 | Status: SHIPPED | OUTPATIENT
Start: 2021-09-13 | End: 2021-09-14

## 2021-09-13 RX ORDER — MECLIZINE HCL 12.5 MG/1
25 TABLET ORAL 3 TIMES DAILY PRN
Status: DISCONTINUED | OUTPATIENT
Start: 2021-09-13 | End: 2021-09-14 | Stop reason: HOSPADM

## 2021-09-13 RX ORDER — ONDANSETRON 4 MG/1
4 TABLET, ORALLY DISINTEGRATING ORAL EVERY 8 HOURS PRN
Qty: 30 TABLET | Refills: 0 | Status: SHIPPED | OUTPATIENT
Start: 2021-09-13 | End: 2021-09-23

## 2021-09-13 RX ORDER — MECLIZINE HYDROCHLORIDE 25 MG/1
25 TABLET ORAL 3 TIMES DAILY PRN
Qty: 30 TABLET | Refills: 0 | Status: SHIPPED | OUTPATIENT
Start: 2021-09-13 | End: 2021-09-23

## 2021-09-13 RX ADMIN — ONDANSETRON 4 MG: 2 INJECTION INTRAMUSCULAR; INTRAVENOUS at 03:51

## 2021-09-13 RX ADMIN — PYRIDOXINE HYDROCHLORIDE 50 MG: 100 INJECTION, SOLUTION INTRAMUSCULAR; INTRAVENOUS at 08:50

## 2021-09-13 RX ADMIN — PREDNISONE 40 MG: 20 TABLET ORAL at 11:08

## 2021-09-13 RX ADMIN — MECLIZINE HCL 12.5 MG 25 MG: 12.5 TABLET ORAL at 11:16

## 2021-09-13 RX ADMIN — ONDANSETRON 4 MG: 4 TABLET, ORALLY DISINTEGRATING ORAL at 14:21

## 2021-09-13 RX ADMIN — ONDANSETRON 4 MG: 2 INJECTION INTRAMUSCULAR; INTRAVENOUS at 08:47

## 2021-09-13 RX ADMIN — MECLIZINE HCL 12.5 MG 25 MG: 12.5 TABLET ORAL at 18:16

## 2021-09-13 RX ADMIN — SODIUM CHLORIDE, PRESERVATIVE FREE 10 ML: 5 INJECTION INTRAVENOUS at 08:47

## 2021-09-13 ASSESSMENT — ENCOUNTER SYMPTOMS
VOMITING: 0
RESPIRATORY NEGATIVE: 1
ABDOMINAL PAIN: 0
NAUSEA: 1
EYES NEGATIVE: 1

## 2021-09-13 ASSESSMENT — PAIN SCALES - GENERAL: PAINLEVEL_OUTOF10: 0

## 2021-09-13 NOTE — PROGRESS NOTES
CLINICAL PHARMACY NOTE: MEDS TO BEDS    Total # of Prescriptions Filled: 3   The following medications were delivered to the patient:  · ZOFRAN 4   · PREDNISONE 20   · MECLIZINE 25    Additional Documentation:    DELIVERED TO PT IN ROOM 343-2, NO COPAT

## 2021-09-13 NOTE — PROGRESS NOTES
Physical Therapy    Facility/Department: [address] MED SURG  Initial Assessment    NAME: Barrington Walton  : 1995  MRN: [id number]    Date of Service: 2021  Chief Complaint   Patient presents with    Emesis During Pregnancy     10 weeks     Discharge Recommendations:  Patient would benefit from continued therapy after discharge   Assessment   Body structures, Functions, Activity limitations: Decreased functional mobility ; Decreased strength;Decreased endurance  Assessment: The pt ambulated 25 ft with a RW x CGA. She reported dizziness with any positional changes of her head. She could benefit from a continuation of PT to address the dizziness and could benefit from the use of a RW  to improve her gait following her DC  Prognosis: Good  Decision Making: Medium Complexity  PT Education: Goals;PT Role;Plan of Care  REQUIRES PT FOLLOW UP: Yes  Activity Tolerance  Activity Tolerance: Patient limited by fatigue       Patient Diagnosis(es): The encounter diagnosis was Nausea. has no past medical history on file. has no past surgical history on file.     Restrictions  Restrictions/Precautions  Restrictions/Precautions: Up as Tolerated  Required Braces or Orthoses?: No  Vision/Hearing  Vision: Impaired  Vision Exceptions: Wears glasses at all times  Hearing: Within functional limits     Subjective  General  Patient assessed for rehabilitation services?: Yes  Response To Previous Treatment: Not applicable  Family / Caregiver Present: Yes  Follows Commands: Within Functional Limits  Subjective  Subjective: RN and pt agreeable to PT eval  Pain Screening  Patient Currently in Pain: Denies  Pain Assessment  Pain Assessment: 0-10  Pain Level: 0  Vital Signs  Patient Currently in Pain: Denies     Orientation  Orientation  Overall Orientation Status: Within Normal Limits  Social/Functional History  Social/Functional History  Lives With: Spouse  Type of Home: House  Home Layout: Two level, Able to Live on Main level with bedroom/bathroom  Home Access: Stairs to enter without rails  Entrance Stairs - Number of Steps: 1  Bathroom Shower/Tub: Tub/Shower unit  Bathroom Toilet: Standard  Home Equipment:  (No DME)  ADL Assistance: Independent  Homemaking Assistance: Independent  Homemaking Responsibilities: Yes  Ambulation Assistance: Independent  Transfer Assistance: Independent  Active : No  Patient's  Info:  drives  Occupation: Unemployed  Leisure & Hobbies: likes to relax  Cognition      Objective        AROM RLE (degrees)  RLE AROM: WNL  AROM LLE (degrees)  LLE AROM : WNL  AROM RUE (degrees)  RUE AROM : WNL  AROM LUE (degrees)  LUE AROM : WNL  Strength RLE  Strength RLE: WFL  Strength LLE  Strength LLE: WFL  Strength RUE  Strength RUE: WFL  Strength LUE  Strength LUE: WFL     Sensation  Overall Sensation Status: WFL  Bed mobility  Supine to Sit: Contact guard assistance  Sit to Supine: Contact guard assistance  Scooting: Contact guard assistance  Transfers  Sit to Stand: Contact guard assistance  Stand to sit: Contact guard assistance  Ambulation  Ambulation?: Yes  Ambulation 1  Surface: level tile  Device: Rolling Walker  Assistance: Contact guard assistance  Gait Deviations: Decreased step length;Decreased step height  Distance: amb 25 ft with a RW x CGA     Balance  Posture: Good  Sitting - Static: Good  Sitting - Dynamic: Fair  Standing - Static: Fair  Standing - Dynamic: Fair      Plan   Plan  Times per week: 5-6x wk  Current Treatment Recommendations: Strengthening, Functional Mobility Training, Gait Training, Safety Education & Training, Endurance Training, Stair training, Positioning  Safety Devices  Type of devices: Nurse notified, Left in bed, Call light within reach    G-Code     OutComes Score     AM-PAC Score  AM-PAC Inpatient Mobility Raw Score : 17 (09/13/21 1507)  AM-PAC Inpatient T-Scale Score : 42.13 (09/13/21 1507)  Mobility Inpatient CMS 0-100% Score: 50.57 (09/13/21 1507)  Mobility Inpatient CMS G-Code Modifier : CK (09/13/21 1507)        Goals  Short term goals  Time Frame for Short term goals: 10 visits  Short term goal 1: transfers with SBA  Short term goal 2: amb 150 ft with a RW x SBA  Short term goal 3: ascend/descend 4 steps with SBA  Short term goal 4: 20 min exercise program x SBA  Patient Goals   Patient goals : to ambulate without dizziness     Therapy Time   Individual Concurrent Group Co-treatment   Time In 1345         Time Out 1410         Minutes 25             [address] of [address] [place], [place] [address]willie Locke, PT

## 2021-09-13 NOTE — PROGRESS NOTES
[facility] Neurology   [address] [place] [address]    Progress Note             Date:   9/13/2021  Patient name:  Alena Love  Date of admission:  9/11/2021  2:22 PM  MRN:   [id number]  Account:  [de-identified]  YOB: 1995  PCP:    BECKY Stephens NP  Room:   [address] [place] [address]  Code Status:    Full Code    Chief Complaint:     Chief Complaint   Patient presents with    Emesis During Pregnancy     10 weeks       Interval hx: The patient was seen and examined at bedside. Is vitally stable, alert and oriented x 3. No acute events overnight. The patient stated that she was nauseous this morning, still complains of dizziness described as lightheadedness, and is worse when she moves her head. She denies any fever, chills. She reports that she had a trauma to her right ear and she have been having recurrent infections. Brief History of Present Illness: The patient is a 31 y.o. Non- / non  female who presents with Emesis During Pregnancy (10 weeks)   and she is admitted to the hospital for the management of  Dizziness     This is a 26 y.o. female who presents to the Emergency Department with complaint of nausea and vomiting.  The patient approximate 11weeks gestational age. Erica Lynn states she is been having nausea and vomiting since being of her pregnancy.  The patient states since Wednesday has been significantly worse. Kamron Bravo says she does feel dizzy. Kamron Bravo denies any abdominal pain.  Denies any dysuria, frequency urgency. Patient states that she is dizzy unable to walk in a straight line, and has ataxia. On exam has nystagmus that is not easily fatigable. No hearing loss.      Neurology is consulted for dizziness/vertigo, nausea, vomiting, nystagmus, gait imbalance  in a 6 week gestational age female patient. She was given dimenhydrinate 50 mg p.o.   Pepcid 20 mg IV  4.5 mg p.o. meclizine  Promethazine 12 mg IM  B6 50 mg IV  Lactated Ringer's    CT head no acute abnormalities  MRI and MRV normal      Past Medical History:     History reviewed. No pertinent past medical history. Past Surgical History:     History reviewed. No pertinent surgical history. Medications Prior to Admission:     Prior to Admission medications    Medication Sig Start Date End Date Taking? Authorizing Provider   ondansetron (ZOFRAN) 4 MG tablet Take 1 tablet by mouth every 8 hours as needed for Nausea 21 Yes Mike Lauren MD        Allergies:     Patient has no known allergies. Social History:     Tobacco:    reports that she has never smoked. She has never used smokeless tobacco.  Alcohol:      reports no history of alcohol use. Drug Use:  reports no history of drug use. Family History:     History reviewed. No pertinent family history. Review of Systems:     Review of Systems   Constitutional: Negative. HENT: Negative for ear pain and hearing loss. Eyes: Negative. Respiratory: Negative. Cardiovascular: Negative. Gastrointestinal: Positive for nausea. Negative for abdominal pain and vomiting. Endocrine: Negative. Genitourinary: Negative. Musculoskeletal: Negative. Skin: Negative. Neurological: Positive for dizziness and light-headedness. Negative for weakness and headaches. Physical Exam:   /69   Pulse 87   Temp 98.8 °F (37.1 °C) (Oral)   Resp 18   Wt 235 lb (106.6 kg)   SpO2 97%   BMI 41.63 kg/m²   Temp (24hrs), Av.8 °F (37.1 °C), Min:98.8 °F (37.1 °C), Max:98.8 °F (37.1 °C)    No results for input(s): POCGLU in the last 72 hours. No intake or output data in the 24 hours ending 21 0719      Neurologic Exam     Mental Status   Oriented to person, place, and time.         GENERAL  Appears comfortable and in no distress   HEENT  NC/ AT   HEART  S1 and S2 heard; palpation of pulses: radial pulse    NECK  Supple and no bruits heard   MENTAL STATUS:  Alert, oriented, intact memory, no confusion, normal speech, normal language, no hallucination or delusion   CRANIAL NERVES: II     -      Visual fields intact to confrontation  III,IV,VI -  PERR, EOMs full, no ptosis  V     -     Normal facial sensation   VII    -     Normal facial symmetry  VIII   -     Intact hearing   IX,X -     Symmetrical palate  XI    -     Symmetrical shoulder shrug  XII   -     Midline tongue, no atrophy    MOTOR FUNCTION: RUE: Significant for good strength of grade 5/5 in proximal and distal muscle groups   LUE: Significant for good strength of grade 5/5 in proximal and distal muscle groups   RLE: Significant for good strength of grade 5/5 in proximal and distal muscle groups   LLE: Significant for good strength of grade 5/5 in proximal and distal muscle groups      Normal bulk, normal tone and no involuntary movements, no tremor   SENSORY FUNCTION:  Normal touch, normal pinprick, normal vibration, normal proprioception   CEREBELLAR FUNCTION:  Intact fine motor control over upper limbs and lower limbs   REFLEX FUNCTION:  Symmetric in upper and lower extremities, no Babinski sign   STATION and GAIT  not examined       Investigations:      Laboratory Testing:  Recent Results (from the past 24 hour(s))   Basic Metabolic Panel    Collection Time: 09/12/21  5:15 PM   Result Value Ref Range    Glucose 91 70 - 99 mg/dL    BUN 6 6 - 20 mg/dL    CREATININE 0.60 0.50 - 0.90 mg/dL    Bun/Cre Ratio NOT REPORTED 9 - 20    Calcium 8.6 8.6 - 10.4 mg/dL    Sodium 134 (L) 135 - 144 mmol/L    Potassium 3.8 3.7 - 5.3 mmol/L    Chloride 103 98 - 107 mmol/L    CO2 19 (L) 20 - 31 mmol/L    Anion Gap 12 9 - 17 mmol/L    GFR Non-African American >60 >60 mL/min    GFR African American >60 >60 mL/min    GFR Comment          GFR Staging NOT REPORTED      Recent Labs     09/11/21  1522   WBC 8.4   RBC 4.09   HGB 11.8*   HCT 38.2   MCV 93.4   MCH 28.9   MCHC 30.9   RDW 13.4   PLT See Reflexed IPF Result   MPV NOT REPORTED     Recent Labs 09/11/21  1522 09/11/21  1522 09/12/21  1715      < > 134*   K 3.6*   < > 3.8   CL 99   < > 103   CO2 17*   < > 19*   BUN 7   < > 6   CREATININE 0.67   < > 0.60   GLUCOSE 110*   < > 91   CALCIUM 9.4   < > 8.6   PROT 7.4  --   --    LABALBU 3.9  --   --    BILITOT 0.26*  --   --    ALKPHOS 71  --   --    AST 13  --   --    ALT 12  --   --     < > = values in this interval not displayed. Hemoglobin A1C   Date Value Ref Range Status   08/28/2018 5.2 4.0 - 6.0 % Final       Assessment :      Primary Problem  <principal problem not specified>    Active Hospital Problems    Diagnosis Date Noted    Nausea and vomiting [R11.2] 09/12/2021    Pregnancy [Z34.90] 09/12/2021    Dizziness [R42] 09/11/2021       Patient is a 31 y.o. female 11 weeks gestation presented to ED with nausea and vomiting for 4 days. History of hyperemesis gravidarum in her first pregnancy. Plan:       -Vestibular neuritis/ BPPV. Unlikely to be wallenberg syndrome   -Hyperemesis gravidarum  -Fasting ketosis? BHB: 1.53  · CT head obtained by ED - Reviewed (minimize radiation in the future, patient is first trimester): No acute intracranial abnormality. · MRI and MRV were normal  · Discuss steroids in the am.   · Symptomatic treatment with antiemetics, antihistamines  · No symptom resolution within 48 hours  · Will discuss need for vestibular rehab  · Recommend fluids based on volume status  · Replace electrolytes  · Magnesium pending  · Further recommendations to follow in the am  · 5 mg p.o. Zofran      Follow-up further recommendations after discussing the case with attending  The plan was discussed with the patient, patient's family and the medical staff.    Consultations:   IP CONSULT TO NEUROLOGY    Patient is admitted as inpatient status because of co-morbidities listed above, severity of signs and symptoms as outlined, requirement for current medical therapies and most importantly because of direct risk to patient if care not provided in a hospital setting.     José Antonio Silva MD  9/13/2021  7:19 AM    Copy sent to Dr. Hussein NIC, BECKY - NP

## 2021-09-13 NOTE — CARE COORDINATION
09/13/21 1137   Readmission Assessment   Number of Days since last admission? 1-7 days   Previous Disposition Home with Family   Who is being Interviewed Patient   What was the patient's/caregiver's perception as to why they think they needed to return back to the hospital? Other (Comment)  (nausea)   Did you visit your Primary Care Physician after you left the hospital, before you returned this time? No   Why weren't you able to visit your PCP? Did not have an appointment   Did you see a specialist, such as Cardiac, Pulmonary, Orthopedic Physician, etc. after you left the hospital? No   Who advised the patient to return to the hospital? Self-referral   Does the patient report anything that got in the way of taking their medications? No   In our efforts to provide the best possible care to you and others like you, can you think of anything that we could have done to help you after you left the hospital the first time, so that you might not have needed to return so soon?  Other (Comment)  (Nausea returned with movement)

## 2021-09-13 NOTE — DISCHARGE SUMMARY
CDU Discharge Summary        Patient:  Karmen Burkett  YOB: 1995    MRN: [id number]   Acct: [de-identified]    Primary Care Physician: BECKY Owens NP    Admit date:  9/11/2021  2:22 PM  Discharge date: 9/14/21    Discharge Diagnoses:     Acute nausea, vomiting, and dizziness due to BPPV and pregnancy associated nausea/vomiting  Improved with fluids, rest, analgesia, antiemetics, antihistamines, and steroids    Follow-up:  Call today/tomorrow for a follow up appointment with Saba COWAN, APRN - NP , or return to the Emergency Room with worsening symptoms    Stressed to patient the importance of following up with primary care doctor for further workup/management of symptoms. Pt verbalizes understanding and agrees with plan. Discharge Medications:  Changes to medications        [facility] Medication Instructions CME:[id number]    Printed on:09/13/21 1038   Medication Information                      meclizine (ANTIVERT) 25 MG tablet  Take 1 tablet by mouth 3 times daily as needed for Dizziness             ondansetron (ZOFRAN) 4 MG tablet  Take 1 tablet by mouth every 8 hours as needed for Nausea             ondansetron (ZOFRAN-ODT) 4 MG disintegrating tablet  Take 1 tablet by mouth every 8 hours as needed for Nausea or Vomiting             predniSONE (DELTASONE) 20 MG tablet  Take 2 tablets by mouth daily for 5 doses                 Diet:  ADULT DIET; Regular , Advance as tolerated     Activity:  As tolerated    Consultants: IP CONSULT TO NEUROLOGY    Procedures:  Not indicated     Diagnostic Test:   Results for orders placed or performed during the hospital encounter of 09/11/21   Culture, Urine    Specimen: Urine, clean catch   Result Value Ref Range    Specimen Description . CLEAN CATCH URINE     Special Requests NOT REPORTED     Culture NO SIGNIFICANT GROWTH    COVID-19, Rapid    Specimen: Nasopharyngeal Swab   Result Value Ref Range    Specimen Description Gill Lin NASOPHARYNGEAL SWAB     SARS-CoV-2, Rapid Not Detected Not Detected   CBC Auto Differential   Result Value Ref Range    WBC 8.4 3.5 - 11.3 k/uL    RBC 4.09 3.95 - 5.11 m/uL    Hemoglobin 11.8 (L) 11.9 - 15.1 g/dL    Hematocrit 38.2 36.3 - 47.1 %    MCV 93.4 82.6 - 102.9 fL    MCH 28.9 25.2 - 33.5 pg    MCHC 30.9 28.4 - 34.8 g/dL    RDW 13.4 11.8 - 14.4 %    Platelets See Reflexed IPF Result 138 - 453 k/uL    MPV NOT REPORTED 8.1 - 13.5 fL    NRBC Automated 0.0 0.0 per 100 WBC    Differential Type NOT REPORTED     WBC Morphology NOT REPORTED     RBC Morphology NOT REPORTED     Platelet Estimate NOT REPORTED     Seg Neutrophils 87 (H) 36 - 65 %    Lymphocytes 9 (L) 24 - 43 %    Monocytes 2 (L) 3 - 12 %    Eosinophils % 0 (L) 1 - 4 %    Basophils 0 0 - 2 %    Immature Granulocytes 1 (H) 0 %    Segs Absolute 7.30 1.50 - 8.10 k/uL    Absolute Lymph # 0.79 (L) 1.10 - 3.70 k/uL    Absolute Mono # 0.19 0.10 - 1.20 k/uL    Absolute Eos # <0.03 0.00 - 0.44 k/uL    Basophils Absolute <0.03 0.00 - 0.20 k/uL    Absolute Immature Granulocyte 0.05 0.00 - 0.30 k/uL   Comprehensive Metabolic Panel w/ Reflex to MG   Result Value Ref Range    Glucose 110 (H) 70 - 99 mg/dL    BUN 7 6 - 20 mg/dL    CREATININE 0.67 0.50 - 0.90 mg/dL    Bun/Cre Ratio NOT REPORTED 9 - 20    Calcium 9.4 8.6 - 10.4 mg/dL    Sodium 135 135 - 144 mmol/L    Potassium 3.6 (L) 3.7 - 5.3 mmol/L    Chloride 99 98 - 107 mmol/L    CO2 17 (L) 20 - 31 mmol/L    Anion Gap 19 (H) 9 - 17 mmol/L    Alkaline Phosphatase 71 35 - 104 U/L    ALT 12 5 - 33 U/L    AST 13 <32 U/L    Total Bilirubin 0.26 (L) 0.3 - 1.2 mg/dL    Total Protein 7.4 6.4 - 8.3 g/dL    Albumin 3.9 3.5 - 5.2 g/dL    Albumin/Globulin Ratio 1.1 1.0 - 2.5    GFR Non-African American >60 >60 mL/min    GFR African American >60 >60 mL/min    GFR Comment          GFR Staging NOT REPORTED    Urinalysis with Microscopic   Result Value Ref Range    Color, UA YELLOW YELLOW    Turbidity UA CLEAR CLEAR    Glucose, Ur NEGATIVE NEGATIVE    Bilirubin Urine NEGATIVE NEGATIVE    Ketones, Urine LARGE (A) NEGATIVE    Specific Gravity, UA 1.023 1.005 - 1.030    Urine Hgb NEGATIVE NEGATIVE    pH, UA 6.0 5.0 - 8.0    Protein, UA NEGATIVE NEGATIVE    Urobilinogen, Urine Normal Normal    Nitrite, Urine NEGATIVE NEGATIVE    Leukocyte Esterase, Urine NEGATIVE NEGATIVE    -          WBC, UA 5 TO 10 0 - 5 /HPF    RBC, UA 0 TO 2 0 - 4 /HPF    Casts UA  0 - 8 /LPF     10 TO 20 HYALINE Reference range defined for non-centrifuged specimen. Crystals, UA NOT REPORTED None /HPF    Epithelial Cells UA 5 TO 10 0 - 5 /HPF    Renal Epithelial, UA NOT REPORTED 0 /HPF    Bacteria, UA FEW (A) None    Mucus, UA NOT REPORTED None    Trichomonas, UA NOT REPORTED None    Amorphous, UA NOT REPORTED None    Other Observations UA NOT REPORTED NOT REQ. Yeast, UA NOT REPORTED None   Immature Platelet Fraction   Result Value Ref Range    Platelet, Immature Fraction NOT REPORTED 1.1 - 10.3 %    Platelet, Fluorescence Platelet clumps present, count appears adequate.  138 - 453 k/uL   Beta-Hydroxybutyrate   Result Value Ref Range    Beta-Hydroxybutyrate 1.53 (H) 0.02 - 0.27 mmol/L   Basic Metabolic Panel   Result Value Ref Range    Glucose 91 70 - 99 mg/dL    BUN 6 6 - 20 mg/dL    CREATININE 0.60 0.50 - 0.90 mg/dL    Bun/Cre Ratio NOT REPORTED 9 - 20    Calcium 8.6 8.6 - 10.4 mg/dL    Sodium 134 (L) 135 - 144 mmol/L    Potassium 3.8 3.7 - 5.3 mmol/L    Chloride 103 98 - 107 mmol/L    CO2 19 (L) 20 - 31 mmol/L    Anion Gap 12 9 - 17 mmol/L    GFR Non-African American >60 >60 mL/min    GFR African American >60 >60 mL/min    GFR Comment          GFR Staging NOT REPORTED      CT Head WO Contrast    Result Date: 9/12/2021  EXAMINATION: CT OF THE HEAD WITHOUT CONTRAST  9/11/2021 11:20 pm TECHNIQUE: CT of the head was performed without the administration of intravenous contrast. Dose modulation, iterative reconstruction, and/or weight based adjustment of the mA/kV was utilized to reduce the radiation dose to as low as reasonably achievable. COMPARISON: None. HISTORY: ORDERING SYSTEM PROVIDED HISTORY: dizziness TECHNOLOGIST PROVIDED HISTORY: dizziness Decision Support Exception - unselect if not a suspected or confirmed emergency medical condition->Emergency Medical Condition (MA) Is the patient pregnant? ->Yes Reason for Exam: dizzy FINDINGS: BRAIN/VENTRICLES: There is no acute intracranial hemorrhage, mass effect or midline shift. No abnormal extra-axial fluid collection. The gray-white differentiation is maintained without evidence of an acute infarct. There is no evidence of hydrocephalus. ORBITS: The visualized portion of the orbits demonstrate no acute abnormality. SINUSES: The visualized paranasal sinuses and mastoid air cells demonstrate no acute abnormality. SOFT TISSUES/SKULL:  No acute abnormality of the visualized skull or soft tissues. No acute intracranial abnormality. MRI may be obtained if clinically indicated     MRV HEAD WO CONTRAST    Result Date: 9/12/2021  EXAMINATION: MRI OF THE BRAIN WITHOUT CONTRAST; MRV OF THE HEAD WITHOUT CONTRAST 9/12/2021 4:08 pm TECHNIQUE: Multiplanar multisequence MRI of the brain was performed without the administration of intravenous contrast.; Multiplanar multisequence MRV of the head was performed without the administration of intravenous contrast. COMPARISON: None. HISTORY: ORDERING SYSTEM PROVIDED HISTORY: headache and dizziness, pregnancy, TECHNOLOGIST PROVIDED HISTORY: headache and dizziness, pregnancy, Is the patient pregnant? ->Yes Reason for Exam: dizziness, headache during pregnancy; ORDERING SYSTEM PROVIDED HISTORY: headache and dizziness, pregnancy, TECHNOLOGIST PROVIDED HISTORY: headache and dizziness, pregnancy, Is the patient pregnant? ->Yes Reason for Exam: dizziness and headache during pregnancy FINDINGS: MRI BRAIN: INTRACRANIAL STRUCTURES/VENTRICLES: There is no acute infarct. No mass effect or midline shift. No evidence of an acute intracranial hemorrhage. The ventricles and sulci are normal in size and configuration. The sellar/suprasellar regions appear unremarkable. The normal signal voids within the major intracranial vessels appear maintained. ORBITS: The visualized portion of the orbits demonstrate no acute abnormality. SINUSES: The visualized paranasal sinuses and mastoid air cells are well aerated. BONES/SOFT TISSUES: The bone marrow signal intensity appears normal. The soft tissues demonstrate no acute abnormality. MRV HEAD: No focal stenosis or thrombus is seen of the major dural venous sinuses. The right transverse and sigmoid sinus are hypoplastic but patent. Normal brain MRI and MRV     MRI BRAIN WO CONTRAST    Result Date: 9/12/2021  EXAMINATION: MRI OF THE BRAIN WITHOUT CONTRAST; MRV OF THE HEAD WITHOUT CONTRAST 9/12/2021 4:08 pm TECHNIQUE: Multiplanar multisequence MRI of the brain was performed without the administration of intravenous contrast.; Multiplanar multisequence MRV of the head was performed without the administration of intravenous contrast. COMPARISON: None. HISTORY: ORDERING SYSTEM PROVIDED HISTORY: headache and dizziness, pregnancy, TECHNOLOGIST PROVIDED HISTORY: headache and dizziness, pregnancy, Is the patient pregnant? ->Yes Reason for Exam: dizziness, headache during pregnancy; ORDERING SYSTEM PROVIDED HISTORY: headache and dizziness, pregnancy, TECHNOLOGIST PROVIDED HISTORY: headache and dizziness, pregnancy, Is the patient pregnant? ->Yes Reason for Exam: dizziness and headache during pregnancy FINDINGS: MRI BRAIN: INTRACRANIAL STRUCTURES/VENTRICLES: There is no acute infarct. No mass effect or midline shift. No evidence of an acute intracranial hemorrhage. The ventricles and sulci are normal in size and configuration. The sellar/suprasellar regions appear unremarkable. The normal signal voids within the major intracranial vessels appear maintained.  ORBITS: The visualized portion of the orbits demonstrate no acute abnormality. SINUSES: The visualized paranasal sinuses and mastoid air cells are well aerated. BONES/SOFT TISSUES: The bone marrow signal intensity appears normal. The soft tissues demonstrate no acute abnormality. MRV HEAD: No focal stenosis or thrombus is seen of the major dural venous sinuses. The right transverse and sigmoid sinus are hypoplastic but patent. Normal brain MRI and MRV           Physical Exam:    General appearance - NAD, AOx 3   Lungs -CTAB, no R/R/R  Heart - RRR, no M/R/G  Abdomen - Soft, NT/ND  Neurological:  MAEx4, No focal motor deficit, sensory loss  Extremities - Cap refil <2 sec in all ext., no edema  Skin -warm, dry      Hospital Course:  Clinical course has improved, labs and imaging reviewed. Yonis Miller originally presented to the hospital on 9/11/2021  2:22 PM. with nausea, vomiting, and dizziness. At that time it was determined that She required further observation and treatment. She was admitted and labs and imaging were followed daily. Imaging results as above. She is medically stable to be discharged. Disposition: Home    Patient stated that they will not drive themselves home from the hospital if they have gotten pain killers/ narcotics earlier that day and that they will arrange for transportation on their own or work with the  for a ride. Patient counseled NOT to drive while under the influence of narcotics/ pain killers. Condition: Good    Patient stable and ready for discharge home. I have discussed plan of care with patient and they are in understanding. They were instructed to read discharge paperwork. All of their questions and concerns were addressed. Time Spent: 4 day      --      This dictation was generated by voice recognition computer software. Although all attempts are made to edit the dictation for accuracy, there may be errors in the transcription that are not intended.

## 2021-09-13 NOTE — PROGRESS NOTES
OBS/CDU   RESIDENT NOTE      Patients PCP is:  Naeem Vergara NP-C, APRN - NP        SUBJECTIVE      No acute events overnight. Patients states emesis no longer occurring. Still having dizziness while laying on left side and standing up, some nausea associated with this. Patients ear canals absent of signs of infection. Will continue IVF, antiemetics and try steroids today. Patient likely dischargeable today      PHYSICAL EXAM      General: NAD, AO X 3  Heent: EMOI, PERRL  Neck: SUPPLE, NO JVD  Cardiovascular: RRR, S1S2  Pulmonary: CTAB, NO SOB  Abdomen: SOFT, NTTP, ND, +BS  Extremities: +2/4 PULSES DISTAL, NO SWELLING  Neuro / Psych: NO NUMBNESS OR TINGLING, MENTATION AT BASELINE    PERTINENT TEST /EXAMS      I have reviewed all available laboratory results. MEDICATIONS CURRENT   sodium chloride flush 0.9 % injection 5-40 mL, 2 times per day  sodium chloride flush 0.9 % injection 5-40 mL, PRN  0.9 % sodium chloride infusion, PRN  enoxaparin (LOVENOX) injection 40 mg, Daily  acetaminophen (TYLENOL) tablet 650 mg, Q4H PRN  ondansetron (ZOFRAN-ODT) disintegrating tablet 4 mg, Q8H PRN   Or  ondansetron (ZOFRAN) injection 4 mg, Q6H PRN  0.9 % sodium chloride infusion, Continuous  pyridoxine (B-6) injection 50 mg, Daily        All medication charted and reviewed. CONSULTS      IP CONSULT TO NEUROLOGY    ASSESSMENT/PLAN     America Montoya is a 31 y.o. female who presents with n/v dizziness     BBPV  -Symptoms of positional dizziness consistent with this  -MRI if symptoms do not resolve in 48 hours  -Fluids, antiemetics, steroids and antihistamines     Hyperemesis gravidarum  -Although needs 5% reduction in weight loss for diagnosis, patient appears to be on extreme spectrum of n/v for pregnancy  -May be confounded by BPPV.  No longer vomiting  -Fluids and antiemetics        · Continue home medications and pain control  · Monitor vitals, labs, and imaging  · DISPO: pending consults and clinical improvement    --  Cheryl Jefferson DO  Emergency Medicine Resident Physician     This dictation was generated by voice recognition computer software. Although all attempts are made to edit the dictation for accuracy, there may be errors in the transcription that are not intended.

## 2021-09-14 VITALS
OXYGEN SATURATION: 97 % | BODY MASS INDEX: 41.63 KG/M2 | TEMPERATURE: 98.8 F | RESPIRATION RATE: 16 BRPM | SYSTOLIC BLOOD PRESSURE: 102 MMHG | WEIGHT: 235 LBS | DIASTOLIC BLOOD PRESSURE: 58 MMHG | HEART RATE: 85 BPM

## 2021-09-14 PROCEDURE — 6370000000 HC RX 637 (ALT 250 FOR IP): Performed by: STUDENT IN AN ORGANIZED HEALTH CARE EDUCATION/TRAINING PROGRAM

## 2021-09-14 PROCEDURE — G0378 HOSPITAL OBSERVATION PER HR: HCPCS

## 2021-09-14 PROCEDURE — 99232 SBSQ HOSP IP/OBS MODERATE 35: CPT | Performed by: STUDENT IN AN ORGANIZED HEALTH CARE EDUCATION/TRAINING PROGRAM

## 2021-09-14 RX ORDER — MAGNESIUM SULFATE 1 G/100ML
1000 INJECTION INTRAVENOUS ONCE
Status: DISCONTINUED | OUTPATIENT
Start: 2021-09-14 | End: 2021-09-14

## 2021-09-14 RX ORDER — PREDNISONE 20 MG/1
40 TABLET ORAL DAILY
Qty: 6 TABLET | Refills: 0 | Status: SHIPPED | OUTPATIENT
Start: 2021-09-14 | End: 2021-09-17

## 2021-09-14 RX ADMIN — MECLIZINE HCL 12.5 MG 25 MG: 12.5 TABLET ORAL at 08:28

## 2021-09-14 RX ADMIN — MAGNESIUM GLUCONATE 500 MG ORAL TABLET 400 MG: 500 TABLET ORAL at 12:03

## 2021-09-14 RX ADMIN — PREDNISONE 40 MG: 20 TABLET ORAL at 08:28

## 2021-09-14 RX ADMIN — ONDANSETRON 4 MG: 4 TABLET, ORALLY DISINTEGRATING ORAL at 08:28

## 2021-09-14 ASSESSMENT — ENCOUNTER SYMPTOMS
ABDOMINAL PAIN: 0
NAUSEA: 1
VOMITING: 0
EYES NEGATIVE: 1
RESPIRATORY NEGATIVE: 1

## 2021-09-14 NOTE — PROGRESS NOTES
OBS/CDU   RESIDENT NOTE      Patients PCP is:  Fredy Mcgee NP-C, BECKY - NP        SUBJECTIVE      No acute events overnight. Patient states she feels much better today. Only having mild dizziness with movement. Patient states she feels ready to go home. Denies any abdominal pain, n/v/d, vaginal pain/bleeding, headache, diplopia or dysphagia. Patient appears well on exam, will discharge today      PHYSICAL EXAM      General: NAD, AO X 3  Heent: EMOI, PERRL  Neck: SUPPLE, NO JVD  Cardiovascular: RRR, S1S2  Pulmonary: CTAB, NO SOB  Abdomen: SOFT, NTTP, ND, +BS  Extremities: +2/4 PULSES DISTAL, NO SWELLING  Neuro / Psych: NO NUMBNESS OR TINGLING, MENTATION AT BASELINE    PERTINENT TEST /EXAMS      I have reviewed all available laboratory results. MEDICATIONS CURRENT   predniSONE (DELTASONE) tablet 40 mg, Daily  meclizine (ANTIVERT) tablet 25 mg, TID PRN  sodium chloride flush 0.9 % injection 5-40 mL, 2 times per day  sodium chloride flush 0.9 % injection 5-40 mL, PRN  0.9 % sodium chloride infusion, PRN  enoxaparin (LOVENOX) injection 40 mg, Daily  acetaminophen (TYLENOL) tablet 650 mg, Q4H PRN  ondansetron (ZOFRAN-ODT) disintegrating tablet 4 mg, Q8H PRN   Or  ondansetron (ZOFRAN) injection 4 mg, Q6H PRN  0.9 % sodium chloride infusion, Continuous  pyridoxine (B-6) injection 50 mg, Daily        All medication charted and reviewed. CONSULTS      IP CONSULT TO NEUROLOGY    ASSESSMENT/PLAN       Stan Martines is a 26 y.o. female who presents with n/v dizziness     BBPV  -Discharge today with vestibular rehab meclizine and steroids  -Symptoms of positional dizziness consistent with this  -MRI if symptoms do not resolve in 48 hours  -Fluids, antiemetics, steroids and antihistamines     Hyperemesis gravidarum  -Although needs 5% reduction in weight loss for diagnosis, patient appears to be on extreme spectrum of n/v for pregnancy  -May be confounded by BPPV.  No longer vomiting  -Fluids and antiemetics        · Continue home medications and pain control  · Monitor vitals, labs, and imaging  · DISPO: pending consults and clinical improvement       --  Ngoc Hunt, DO  Emergency Medicine Resident Physician     This dictation was generated by voice recognition computer software. Although all attempts are made to edit the dictation for accuracy, there may be errors in the transcription that are not intended.

## 2021-09-14 NOTE — PROGRESS NOTES
[facility] Neurology   [facility]    Progress Note             Date:   9/14/2021  Patient name:  Romel Ryder  Date of admission:  9/11/2021  2:22 PM  MRN:   [id number]  Account:  [de-identified]  YOB: 1995  PCP:    BECKY Stephen NP  Room:   [address] [place] [address]  Code Status:    Full Code    Chief Complaint:     Chief Complaint   Patient presents with    Emesis During Pregnancy     10 weeks       Interval hx: The patient was seen and examined at bedside. Is vitally stable, alert and oriented x3 . No acute events overnight. The patient stated that her dizziness has decreased significantly since admission. She denies any headache, fever, chills. She was asking if she needs a roller walker at home. Brief History of Present Illness: The patient is a 31 y.o. Non- / non  female who presents with Emesis During Pregnancy (10 weeks)   and she is admitted to the hospital for the management of  Dizziness    This is a 26 y.o. female who presents to the Emergency Department with complaint of nausea and vomiting.  The patient approximate 11weeks gestational age. Christi Goltz states she is been having nausea and vomiting since being of her pregnancy.  The patient states since Wednesday has been significantly worse. Lakesha Janes says she does feel dizzy. Lakesha Marrero denies any abdominal pain.  Denies any dysuria, frequency urgency. Patient states that she is dizzy unable to walk in a straight line, and has ataxia. On exam has nystagmus that is not easily fatigable. No hearing loss.      Neurology is consulted for dizziness/vertigo, nausea, vomiting, nystagmus, gait imbalance  in a 6 week gestational age female patient.          She was given dimenhydrinate 50 mg p.o.   Pepcid 20 mg IV  4.5 mg p.o. meclizine  Promethazine 12 mg IM  B6 50 mg IV  Lactated Ringer's     CT head no acute abnormalities  MRI and MRV normal           Past Medical History:     History reviewed. No pertinent past medical history. Past Surgical History:     History reviewed. No pertinent surgical history. Medications Prior to Admission:     Prior to Admission medications    Medication Sig Start Date End Date Taking? Authorizing Provider   meclizine (ANTIVERT) 25 MG tablet Take 1 tablet by mouth 3 times daily as needed for Dizziness 21 Yes Jalil Long DO   predniSONE (DELTASONE) 20 MG tablet Take 2 tablets by mouth daily for 5 doses 21 Yes Jalil Long DO   ondansetron (ZOFRAN-ODT) 4 MG disintegrating tablet Take 1 tablet by mouth every 8 hours as needed for Nausea or Vomiting 21 Yes Jalil Long DO   ondansetron (ZOFRAN) 4 MG tablet Take 1 tablet by mouth every 8 hours as needed for Nausea 21 Yes Zoey Ramirez MD        Allergies:     Patient has no known allergies. Social History:     Tobacco:    reports that she has never smoked. She has never used smokeless tobacco.  Alcohol:      reports no history of alcohol use. Drug Use:  reports no history of drug use. Family History:     History reviewed. No pertinent family history. Review of Systems:     Review of Systems   Constitutional: Negative. HENT: Negative for ear pain and hearing loss. Eyes: Negative. Respiratory: Negative. Cardiovascular: Negative. Gastrointestinal: Positive for nausea. Negative for abdominal pain and vomiting. Endocrine: Negative. Genitourinary: Negative. Musculoskeletal: Negative. Skin: Negative. Neurological: Positive for dizziness and light-headedness. Negative for weakness and headaches. Physical Exam:   /64   Pulse 95   Temp 98.1 °F (36.7 °C) (Oral)   Resp 19   Wt 235 lb (106.6 kg)   SpO2 96%   BMI 41.63 kg/m²   Temp (24hrs), Av.5 °F (36.9 °C), Min:98.1 °F (36.7 °C), Max:98.8 °F (37.1 °C)    No results for input(s): POCGLU in the last 72 hours.   No intake or output data in the 24 hours ending 09/14/21 0737      Neurologic Exam     Mental Status   Oriented to person, place, and time.         GENERAL  Appears comfortable and in no distress   HEENT  NC/ AT   HEART  S1 and S2 heard; palpation of pulses: radial pulse    NECK  Supple and no bruits heard   MENTAL STATUS:  Alert, oriented, intact memory, no confusion, normal speech, normal language, no hallucination or delusion   CRANIAL NERVES: II     -      Visual fields intact to confrontation  III,IV,VI -  PERR, EOMs full, no ptosis  V     -     Normal facial sensation   VII    -     Normal facial symmetry  VIII   -     Intact hearing   IX,X -     Symmetrical palate  XI    -     Symmetrical shoulder shrug  XII   -     Midline tongue, no atrophy    MOTOR FUNCTION: RUE: Significant for good strength of grade 5/5 in proximal and distal muscle groups   LUE: Significant for good strength of grade 5/5 in proximal and distal muscle groups   RLE: Significant for good strength of grade 5/5 in proximal and distal muscle groups   LLE: Significant for good strength of grade 5/5 in proximal and distal muscle groups      Normal bulk, normal tone and no involuntary movements, no tremor   SENSORY FUNCTION:  Normal touch, normal pinprick, normal vibration, normal proprioception   CEREBELLAR FUNCTION:  Intact fine motor control over upper limbs and lower limbs   REFLEX FUNCTION:  Symmetric in upper and lower extremities, no Babinski sign   STATION and GAIT  Not done        Investigations:      Laboratory Testing:  Recent Results (from the past 24 hour(s))   MAGNESIUM    Collection Time: 09/13/21 11:15 AM   Result Value Ref Range    Magnesium 1.8 1.6 - 2.6 mg/dL     Recent Labs     09/11/21  1522   WBC 8.4   RBC 4.09   HGB 11.8*   HCT 38.2   MCV 93.4   MCH 28.9   MCHC 30.9   RDW 13.4   PLT See Reflexed IPF Result   MPV NOT REPORTED     Recent Labs     09/11/21  1522 09/11/21  1522 09/12/21  1715      < > 134*   K 3.6*   < > 3.8   CL 99   < > 103   CO2 17*   < in a hospital setting.     Hema Alamo MD  9/14/2021  7:37 AM    Copy sent to Dr. Luis Chan NPElC, APRN - NP

## 2021-09-14 NOTE — PLAN OF CARE
Problem: Pain:  Goal: Control of chronic pain  Description: Control of chronic pain  Outcome: Ongoing     Problem: Falls - Risk of:  Goal: Will remain free from falls  Description: Will remain free from falls  Outcome: Ongoing

## 2021-09-14 NOTE — PROGRESS NOTES
[address]  CDU / OBSERVATION ENCOUNTER  INTERVAL NOTE       In a routine reassessment of the patient I have found the following:        Patient lying in bed stating she still feels dizzy when ambulating. Patient given dose of Ancef prior and is requiring multiple doses of antiemetics. Will continue to monitor and reassess tomorrow for clinical improvement. Patient now required admission for multiple doses of antiemetics and inability to ambulate without symptoms. The Family history, social history, and ROS are effectively unchanged since admission unless noted elsewhere in the chart. The patient has been updated on the plan of care. The patient is agreeable with the current plan.     Walker Hirscho, APRN - CNP

## 2021-09-14 NOTE — PROGRESS NOTES
[facility]  CDU / OBSERVATION ENCOUNTER  ATTENDING NOTE       I performed a history and physical examination of the patient and discussed management with the resident. I reviewed the residents note and agree with the documented findings and plan of care. Any areas of disagreement are noted on the chart. I was personally present for the key portions of any procedures. I have documented in the chart those procedures where I was not present during the key portions. I have reviewed the nurses notes. I agree with the chief complaint, past medical history, past surgical history, allergies, medications, social and family history as documented unless otherwise noted below. The Family history, social history, and ROS are effectively unchanged since admission unless noted elsewhere in the chart. The patient is a 31year old female who presents for evaluation of nausea and vomiting in pregnancy as well as dizziness from BPPV. She has responded well to treatment and symptoms are well controlled this morning. She has been cleared by Neurology. Plan for discharge home with outpatient follow up with her OB and PCP.     Josr Varela DO  Attending Emergency Physician

## 2021-09-15 ENCOUNTER — CARE COORDINATION (OUTPATIENT)
Dept: CASE MANAGEMENT | Age: 26
End: 2021-09-15

## 2021-09-15 NOTE — CARE COORDINATION
Le[facility] Transitions Initial Follow Up Call    Call within 2 business days of discharge: Yes    Patient: Naya Henson Patient : 1995   MRN: <[id number]>  Reason for Admission: Nausea  Discharge Date: 21 RARS: Readmission Risk Score: 8      Last Discharge [facility]       Complaint Diagnosis Description Type Department Provider    21 Emesis During Pregnancy Nausea ED to Hosp-Admission (Discharged) (ADMITTED) [facility] 3C Perlita Solano MD; Aleksandra Gay Hy. .. First attempt at initial 24 hour CTN call     Spoke with:  Called to speak with patient for initial  transition of care. Wireless caller not available will attempt again at later time. Facility:[facility] provided:  Obtained and reviewed discharge summary and/or continuity of care documents    Care Transitions 24 Hour Call    Care Transitions Interventions         Follow Up  No future appointments.     Ben Vigil LPN

## 2021-09-15 NOTE — PROGRESS NOTES
Physician Progress Note      Sander Zimmerman  CSN #:                  [id number]  :                       1995  ADMIT DATE:       2021 2:22 PM  Edy Glover DATE:        2021 1:32 PM  RESPONDING  PROVIDER #:        Omkar Galindo MD          QUERY TEXT:    Patient admitted with BMI 41.63. If possible, please document in progress   notes and discharge summary if you are evaluating and /or treating any of the   following: The medical record reflects the following:  Risk Factors: hx prediabetes  Clinical Indicators: BMI 41.63, pt 11 weeks gestation of pregnancy, per H&P   \"Hyperemesis gravidarum, although needs 5% reduction in weight loss for   diagnosis, patient appears to be on extreme spectrum of n/v for pregnancy\"  Treatment: monitor weight, I&O    Call if any questions. Thank you, Nima Avalos, [facility]  Options provided:  -- Obesity  -- Morbid obesity  -- Severe obesity  -- Overweight  -- BMI not clinically significant  -- Other - I will add my own diagnosis  -- Disagree - Not applicable / Not valid  -- Disagree - Clinically unable to determine / Unknown  -- Refer to Clinical Documentation Reviewer    PROVIDER RESPONSE TEXT:    This patient has morbid obesity.     Query created by: Babatunde Barr on 2021 8:45 AM      Electronically signed by:  Omkar Galindo MD 9/15/2021 2:00 PM

## 2021-09-16 ENCOUNTER — CARE COORDINATION (OUTPATIENT)
Dept: CASE MANAGEMENT | Age: 26
End: 2021-09-16

## 2021-12-25 ENCOUNTER — HOSPITAL ENCOUNTER (EMERGENCY)
Age: 26
Discharge: HOME | End: 2021-12-25
Attending: EMERGENCY MEDICINE
Payer: COMMERCIAL

## 2021-12-25 VITALS
HEART RATE: 115 BPM | WEIGHT: 257 LBS | SYSTOLIC BLOOD PRESSURE: 122 MMHG | RESPIRATION RATE: 22 BRPM | TEMPERATURE: 99 F | DIASTOLIC BLOOD PRESSURE: 80 MMHG | HEIGHT: 63 IN | BODY MASS INDEX: 45.54 KG/M2 | OXYGEN SATURATION: 96 %

## 2021-12-25 DIAGNOSIS — S02.5XXB OPEN FRACTURE OF TOOTH, INITIAL ENCOUNTER: Primary | ICD-10-CM

## 2021-12-25 PROCEDURE — 99283 EMERGENCY DEPT VISIT LOW MDM: CPT

## 2021-12-25 PROCEDURE — 64400 NJX AA&/STRD TRIGEMINAL NRV: CPT

## 2021-12-25 PROCEDURE — 2500000003 HC RX 250 WO HCPCS: Performed by: EMERGENCY MEDICINE

## 2021-12-25 PROCEDURE — 6370000000 HC RX 637 (ALT 250 FOR IP): Performed by: EMERGENCY MEDICINE

## 2021-12-25 RX ORDER — LIDOCAINE HYDROCHLORIDE 10 MG/ML
5 INJECTION, SOLUTION INFILTRATION; PERINEURAL ONCE
Status: COMPLETED | OUTPATIENT
Start: 2021-12-25 | End: 2021-12-25

## 2021-12-25 RX ORDER — PENICILLIN V POTASSIUM 500 MG/1
500 TABLET ORAL 4 TIMES DAILY
Qty: 28 TABLET | Refills: 0 | Status: SHIPPED | OUTPATIENT
Start: 2021-12-25 | End: 2022-01-01

## 2021-12-25 RX ORDER — PENICILLIN V POTASSIUM 250 MG/1
500 TABLET ORAL ONCE
Status: COMPLETED | OUTPATIENT
Start: 2021-12-25 | End: 2021-12-25

## 2021-12-25 RX ADMIN — LIDOCAINE HYDROCHLORIDE 5 ML: 10 INJECTION, SOLUTION INFILTRATION; PERINEURAL at 00:50

## 2021-12-25 RX ADMIN — PENICILLIN V POTASSIUM 500 MG: 250 TABLET ORAL at 00:50

## 2021-12-25 RX ADMIN — BENZOCAINE 1 EACH: 220 GEL, DENTIFRICE DENTAL at 00:50

## 2021-12-25 ASSESSMENT — ENCOUNTER SYMPTOMS
SHORTNESS OF BREATH: 0
SORE THROAT: 0
NAUSEA: 0
VOMITING: 0
CONSTIPATION: 0
DIARRHEA: 0
ABDOMINAL PAIN: 0

## 2021-12-25 ASSESSMENT — PAIN DESCRIPTION - FREQUENCY: FREQUENCY: CONTINUOUS

## 2021-12-25 ASSESSMENT — PAIN DESCRIPTION - LOCATION: LOCATION: TEETH

## 2021-12-25 ASSESSMENT — PAIN DESCRIPTION - ORIENTATION: ORIENTATION: UPPER

## 2021-12-25 ASSESSMENT — PAIN DESCRIPTION - ONSET: ONSET: AWAKENED FROM SLEEP

## 2021-12-25 ASSESSMENT — PAIN DESCRIPTION - DESCRIPTORS: DESCRIPTORS: SHARP;SHOOTING

## 2021-12-25 ASSESSMENT — PAIN SCALES - GENERAL: PAINLEVEL_OUTOF10: 8

## 2021-12-25 NOTE — Clinical Note
Bridgett Scott was seen and treated in our emergency department on 12/25/2021. She may return to work on 12/27/2021. If you have any questions or concerns, please don't hesitate to call.       Baron Viviana MD

## 2021-12-25 NOTE — ED PROVIDER NOTES
[facility]     Emergency Department     Faculty Attestation    I performed a history and physical examination of the patient and discussed management with the resident. I have reviewed and agree with the residents findings including all diagnostic interpretations, and treatment plans as written. Any areas of disagreement are noted on the chart. I was personally present for the key portions of any procedures. I have documented in the chart those procedures where I was not present during the key portions. I have reviewed the emergency nurses triage note. I agree with the chief complaint, past medical history, past surgical history, allergies, medications, social and family history as documented unless otherwise noted below. Documentation of the HPI, Physical Exam and Medical Decision Making performed by scribisabelle is based on my personal performance of the HPI, PE and MDM. For Physician Assistant/ Nurse Practitioner cases/documentation I have personally evaluated this patient and have completed at least one if not all key elements of the E/M (history, physical exam, and MDM). Additional findings are as noted. 30 yo F c/o r upper dental pain, no fever, no vomit,   pe #5 partial brake in enamel, no dental abscess,   Mild diffuse dental caries,   Non toxic, airway intact     Dental referral & abx,   // I was present for dental block, vss    EKG Interpretation    Interpreted by me      CRITICAL CARE: There was a high probability of clinically significant/life threatening deterioration in this patient's condition which required my urgent intervention. Total critical care time was 0 minutes. This excludes any time for separately reportable procedures.        [facility] 24, DO  12/25/21 [facility],   12/25/21 7196

## 2021-12-25 NOTE — ED PROVIDER NOTES
[address] [facility]  ED  Emergency Department Encounter  EmergencyMedicine Resident     Pt Patti Herring  MRN: [id number]  Paologfmayda 1995  Date of evaluation: 12/25/21  PCP:  Kathya Chambers NP-C, BECKY - NP    CHIEF COMPLAINT       Chief Complaint   Patient presents with    Dental Pain       HISTORY OF PRESENT ILLNESS  (Location/Symptom, Timing/Onset, Context/Setting, Quality, Duration, Modifying Factors, Severity.)      Jae Fragoso is a 31 y.o. female who presents to the emergency department with right-sided superior premolar pain for the past day which impairs her ability to chew. Patient is able to eat and drink normally but comes to the ER due to pain. She denies fever, chills, other HEENT symptoms including ear pain, chest pain, shortness of breath, abdominal pain, nausea or vomiting, or problems with urination or bowel movements. No new numbness or tingling anywhere. PAST MEDICAL / SURGICAL / SOCIAL / FAMILY HISTORY      has no past medical history on file. has no past surgical history on file. Social History     Socioeconomic History    Marital status: Single     Spouse name: Not on file    Number of children: Not on file    Years of education: Not on file    Highest education level: Not on file   Occupational History    Not on file   Tobacco Use    Smoking status: Never Smoker    Smokeless tobacco: Never Used   Vaping Use    Vaping Use: Never used   Substance and Sexual Activity    Alcohol use: Never    Drug use: Never    Sexual activity: Never   Other Topics Concern    Not on file   Social History Narrative    Not on file     Social Determinants of Health     Financial Resource Strain:     Difficulty of Paying Living Expenses: Not on file   Food Insecurity:     Worried About Running Out of Food in the Last Year: Not on file    Telly of Food in the Last Year: Not on file   Transportation Needs:     Lack of Transportation (Medical):  Not on file    Lack of Transportation (Non-Medical): Not on file   Physical Activity:     Days of Exercise per Week: Not on file    Minutes of Exercise per Session: Not on file   Stress:     Feeling of Stress : Not on file   Social Connections:     Frequency of Communication with Friends and Family: Not on file    Frequency of Social Gatherings with Friends and Family: Not on file    Attends Hindu Services: Not on file    Active Member of [address] [place] [address] or Organizations: Not on file    Attends Club or Organization Meetings: Not on file    Marital Status: Not on file   Intimate Partner Violence:     Fear of Current or Ex-Partner: Not on file    Emotionally Abused: Not on file    Physically Abused: Not on file    Sexually Abused: Not on file   Housing Stability:     Unable to Pay for Housing in the Last Year: Not on file    Number of [place] in the Last Year: Not on file    Unstable Housing in the Last Year: Not on file       History reviewed. No pertinent family history. Allergies:  Patient has no known allergies. Home Medications:  Prior to Admission medications    Medication Sig Start Date End Date Taking? Authorizing Provider   penicillin v potassium (VEETID) 500 MG tablet Take 1 tablet by mouth 4 times daily for 7 days 12/25/21 1/1/22 Yes Prosper Marquez MD       REVIEW OF SYSTEMS    (2-9 systems for level 4, 10 or more for level 5)      Review of Systems   Constitutional: Negative for chills and fever. HENT: Positive for dental problem. Negative for ear pain, hearing loss and sore throat. Eyes: Negative for visual disturbance. Respiratory: Negative for shortness of breath. Cardiovascular: Negative for chest pain. Gastrointestinal: Negative for abdominal pain, constipation, diarrhea, nausea and vomiting. Genitourinary: Negative for difficulty urinating and dysuria. Musculoskeletal: Negative for arthralgias and myalgias. Neurological: Negative for numbness.    Psychiatric/Behavioral: Negative for agitation and confusion. PHYSICAL EXAM   (up to 7 for level 4, 8 or more for level 5)      INITIAL VITALS:   /80   Pulse 115   Temp 99 °F (37.2 °C) (Oral)   Resp 22   Ht 5' 3\" (1.6 m)   Wt 257 lb (116.6 kg)   SpO2 96%   BMI 45.53 kg/m²     Physical Exam  Vitals and nursing note reviewed. Constitutional:       General: She is not in acute distress. Appearance: Normal appearance. She is well-developed. She is not ill-appearing or diaphoretic. HENT:      Head: Normocephalic and atraumatic. Right Ear: External ear normal.      Left Ear: External ear normal.      Nose: Nose normal.      Mouth/Throat:      Mouth: Mucous membranes are moist.      Comments: Garrido III fracture of #4  Eyes:      Extraocular Movements: Extraocular movements intact. Conjunctiva/sclera: Conjunctivae normal.   Neck:      Trachea: No tracheal deviation. Cardiovascular:      Rate and Rhythm: Normal rate and regular rhythm. Heart sounds: Normal heart sounds. No murmur heard. No friction rub. No gallop. Pulmonary:      Effort: Pulmonary effort is normal. No respiratory distress. Breath sounds: Normal breath sounds. No wheezing, rhonchi or rales. Abdominal:      General: Abdomen is flat. There is no distension. Musculoskeletal:         General: No swelling, deformity or signs of injury. Normal range of motion. Cervical back: Normal range of motion and neck supple. Skin:     General: Skin is warm and dry. Coloration: Skin is not jaundiced. Findings: No bruising or lesion. Neurological:      General: No focal deficit present. Mental Status: She is alert and oriented to person, place, and time. Mental status is at baseline. Motor: No abnormal muscle tone. DIFFERENTIAL  DIAGNOSIS     PLAN (LABS / IMAGING / EKG):  No orders of the defined types were placed in this encounter.       MEDICATIONS ORDERED:  Orders Placed This Encounter   Medications    benzocaine (LOLLICAINE) 20 % dental swab    lidocaine 1 % injection 5 mL    penicillin v potassium (VEETID) 500 MG tablet     Sig: Take 1 tablet by mouth 4 times daily for 7 days     Dispense:  28 tablet     Refill:  0    penicillin v potassium (VEETID) tablet 500 mg     Order Specific Question:   Antimicrobial Indications     Answer:   Head and Neck Infection       DDX: Silvana Silverman is a 31 y.o. female who presents to the emergency department with right-sided dental pain. Differential diagnosis includes dental fracture, periodontitis    DIAGNOSTIC RESULTS / EMERGENCY DEPARTMENT COURSE / MDM   LAB RESULTS:  No results found for this visit on 12/25/21. IMPRESSION: Silvana Silverman is a 31 y.o. female who presents to the emergency department with right-sided dental pain. On examination she is afebrile, vital signs demonstrate tachycardia and tachypnea and examination demonstrates an uncomfortable appearing female with right tooth #4 Danielito Hoist type III fracture with no periodontitis or gingival irritation noted. Nonetheless patient requires antibiotics and we will perform apical block and temporary cement placement with dental [facility] follow-up. Patient verbalizes understanding and agreement with plan. Advised to avoid smoking. RADIOLOGY:  No results found. EKG  None    All EKG's are interpreted by the Emergency Department Physician who either signs or co-signs this chart in the absence of a cardiologist.    EMERGENCY DEPARTMENT COURSE:       PROCEDURES:  PROCEDURE NOTE - DENTAL BLOCK    PATIENT NAME: Silvana Silverman  MEDICAL RECORD NO. [id number]  DATE: 12/25/2021  ATTENDING PHYSICIAN: [facility] Kiran    PREOPERATIVE DIAGNOSIS:  Dental Pain  POSTOPERATIVE DIAGNOSIS:  Same  PROCEDURE PERFORMED:  right apical block  PERFORMING PHYSICIAN: Jose Cruz Grey MD    DISCUSSION:  Silvana Silverman is a 31y.o.-year-old female who requires local dental block for pain relief.   The history and physical examination were reviewed and confirmed. CONSENT: The patient provided verbal consent for this procedure. PROCEDURE: The patient was placed in supine position. Topical anesthetic was placed over injection site. A right apical block was performed by injecting 2 cc of 1% Lidocaine without epinephrine    The patient tolerated the procedure well. Complications include None     Federica Solis MD  1:26 AM, 12/25/21      CONSULTS:  None    CRITICAL CARE:  None    FINAL IMPRESSION      1. Open fracture of tooth, initial encounter          DISPOSITION / PLAN     DISPOSITION Decision To Discharge 12/25/2021 01:21:42 AM      PATIENT REFERRED TO:  Hermila Boothleanna R[address].  [address], [address] [address]  [phone]  Schedule an appointment as soon as possible for a visit in 3 days  For followup    BECKY Mata NP  [facility]  [phone]    Schedule an appointment as soon as possible for a visit in 1 week  For followup    [facility] ED  [address] [address]  [phone]  Go to   As needed, If symptoms worsen      DISCHARGE MEDICATIONS:  New Prescriptions    PENICILLIN V POTASSIUM (VEETID) 500 MG TABLET    Take 1 tablet by mouth 4 times daily for 7 days       Federica Solis MD  Emergency Medicine Resident    This patient was evaluated in the Emergency Department for symptoms described in the history of present illness. He/she was evaluated in the context of the global COVID-19 pandemic, which necessitated consideration that the patient might be at risk for infection with the SARS-CoV-2 virus that causes COVID-19. Institutional protocols and algorithms that pertain to the evaluation of patients at risk for COVID-19 are in a state of rapid change based on information released by regulatory bodies including the CDC and federal and state organizations. These policies and algorithms were followed during the patient's care in the ED.     (Please note that portions of thisnote were completed with a voice recognition program.  Efforts were made to edit the dictations but occasionally words are mis-transcribed.)        Mine Haney MD  Resident  12/25/21 6687

## 2022-06-22 NOTE — ED NOTES
How Severe Is It?: moderate The following labs labeled with pt sticker and tubed to lab:     [] Blue     [] Lavender   [] on ice  [] Green/yellow  [] Green/black [] on ice  [] Yellow  [] Red  [] Pink      [] COVID-19 swab    [] Rapid  [] PCR  [] Peds Viral Panel     [x] Urine Sample  [] Pelvic Cultures  [] Blood Cultures            Lethea Osler, RN  09/11/21 7817 Is This A New Presentation, Or A Follow-Up?: Rash

## 2025-02-04 ENCOUNTER — HOSPITAL ENCOUNTER (EMERGENCY)
Age: 30
Discharge: HOME | End: 2025-02-04
Attending: EMERGENCY MEDICINE
Payer: MEDICARE

## 2025-02-04 VITALS
HEART RATE: 95 BPM | HEIGHT: 63 IN | TEMPERATURE: 98.4 F | WEIGHT: 255 LBS | RESPIRATION RATE: 16 BRPM | SYSTOLIC BLOOD PRESSURE: 150 MMHG | OXYGEN SATURATION: 99 % | DIASTOLIC BLOOD PRESSURE: 98 MMHG | BODY MASS INDEX: 45.18 KG/M2

## 2025-02-04 DIAGNOSIS — H92.01 EAR PAIN, RIGHT: Primary | ICD-10-CM

## 2025-02-04 PROCEDURE — 99283 EMERGENCY DEPT VISIT LOW MDM: CPT

## 2025-02-04 PROCEDURE — 6370000000 HC RX 637 (ALT 250 FOR IP): Performed by: STUDENT IN AN ORGANIZED HEALTH CARE EDUCATION/TRAINING PROGRAM

## 2025-02-04 RX ORDER — FLUTICASONE PROPIONATE 50 MCG
2 SPRAY, SUSPENSION (ML) NASAL DAILY
Qty: 16 G | Refills: 0 | Status: SHIPPED | OUTPATIENT
Start: 2025-02-04

## 2025-02-04 RX ORDER — CITALOPRAM HYDROBROMIDE 10 MG/1
TABLET ORAL DAILY
COMMUNITY
Start: 2024-10-09

## 2025-02-04 RX ORDER — FLUTICASONE PROPIONATE 50 MCG
1 SPRAY, SUSPENSION (ML) NASAL ONCE
Status: COMPLETED | OUTPATIENT
Start: 2025-02-04 | End: 2025-02-04

## 2025-02-04 RX ADMIN — FLUTICASONE PROPIONATE 1 SPRAY: 50 SPRAY, METERED NASAL at 04:38

## 2025-02-04 ASSESSMENT — PAIN SCALES - GENERAL: PAINLEVEL_OUTOF10: 9
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
## 2025-02-04 NOTE — ED NOTES
Patient to ED via triage with complaints of right ear pain. Patient states she feels as if there is a hole in her eardrum. Patient reports sensitivity to air and sound. Patient denies hearing loss or drainage. Patient is A&O x4 and RR even and unlabored. Call light within reach.

## 2025-02-04 NOTE — ED PROVIDER NOTES
[facility]     Emergency Department     Faculty Attestation    I performed a history and physical examination of the patient and discussed management with the resident. I have reviewed and agree with the resident’s findings including all diagnostic interpretations, and treatment plans as written. Any areas of disagreement are noted on the chart. I was personally present for the key portions of any procedures. I have documented in the chart those procedures where I was not present during the key portions. I have reviewed the emergency nurses triage note. I agree with the chief complaint, past medical history, past surgical history, allergies, medications, social and family history as documented unless otherwise noted below. Documentation of the HPI, Physical Exam and Medical Decision Making performed by [email or web address] is based on my personal performance of the HPI, PE and MDM. For Physician Assistant/ Nurse Practitioner cases/documentation I have personally evaluated this patient and have completed at least one if not all key elements of the E/M (history, physical exam, and MDM). Additional findings are as noted.    Note Started: 4:27 AM EST     31 yo F R ear pain, no fever, no injury,   PE vss gcs 15 JONES, EOMI,  R tm intact, no erythema, no perforation, no tenderness over R mastoid air cell, face symmetric, neck supple,   ---has appointment in am with pcp// s/s resolved with flonase, follow up stressed   EKG Interpretation    Interpreted by me      CRITICAL CARE: There was a high probability of clinically significant/life threatening deterioration in this patient's condition which required my urgent intervention.  Total critical care time was 0 minutes.  This excludes any time for separately reportable procedures.       Garcia Perez DO  02/04/25 0431       Garcia Carr DO  02/04/25 0432       Garcia Carr DO  02/04/25 0754

## 2025-02-04 NOTE — DISCHARGE INSTRUCTIONS
You were seen today in the emergency department for concerns of a hole in your right eardrum.  On examination, there is no hole in your eardrum, as these tend to heal over time.  You are still having pain near your ear, and you have been sent home on Flonase to help with potential congestive/pressure pain.  Please sleep at night with your head of bed elevated, hydrate more than you normally would to help with decongesting, and follow-up with either our ENT team (contact info attached) or an ENT of your primary care doctor's choice.

## 2025-02-27 ENCOUNTER — HOSPITAL ENCOUNTER (EMERGENCY)
Age: 30
Discharge: HOME | End: 2025-02-27
Attending: EMERGENCY MEDICINE
Payer: MEDICARE

## 2025-02-27 VITALS
RESPIRATION RATE: 17 BRPM | OXYGEN SATURATION: 98 % | WEIGHT: 266.76 LBS | DIASTOLIC BLOOD PRESSURE: 85 MMHG | HEART RATE: 95 BPM | SYSTOLIC BLOOD PRESSURE: 132 MMHG | TEMPERATURE: 98.8 F | BODY MASS INDEX: 47.25 KG/M2

## 2025-02-27 DIAGNOSIS — K08.89 PAIN, DENTAL: Primary | ICD-10-CM

## 2025-02-27 PROCEDURE — 99283 EMERGENCY DEPT VISIT LOW MDM: CPT

## 2025-02-27 PROCEDURE — 6370000000 HC RX 637 (ALT 250 FOR IP)

## 2025-02-27 RX ORDER — IBUPROFEN 400 MG/1
400 TABLET, FILM COATED ORAL ONCE
Status: COMPLETED | OUTPATIENT
Start: 2025-02-27 | End: 2025-02-27

## 2025-02-27 RX ORDER — IBUPROFEN 400 MG/1
400 TABLET, FILM COATED ORAL EVERY 6 HOURS PRN
Qty: 30 TABLET | Refills: 0 | Status: SHIPPED | OUTPATIENT
Start: 2025-02-28

## 2025-02-27 RX ORDER — ACETAMINOPHEN 500 MG
500 TABLET ORAL EVERY 6 HOURS PRN
Qty: 30 TABLET | Refills: 0 | Status: SHIPPED | OUTPATIENT
Start: 2025-02-27

## 2025-02-27 RX ADMIN — AMOXICILLIN AND CLAVULANATE POTASSIUM 1 TABLET: 875; 125 TABLET, FILM COATED ORAL at 19:18

## 2025-02-27 RX ADMIN — IBUPROFEN 400 MG: 400 TABLET, FILM COATED ORAL at 19:18

## 2025-02-27 ASSESSMENT — PAIN - FUNCTIONAL ASSESSMENT
PAIN_FUNCTIONAL_ASSESSMENT: 0-10
PAIN_FUNCTIONAL_ASSESSMENT: NONE - DENIES PAIN

## 2025-02-27 ASSESSMENT — PAIN DESCRIPTION - LOCATION: LOCATION: TEETH

## 2025-02-27 ASSESSMENT — ENCOUNTER SYMPTOMS
DIARRHEA: 0
EYE PAIN: 0
FACIAL SWELLING: 1
NAUSEA: 0
COUGH: 0
ABDOMINAL PAIN: 0
VOMITING: 0

## 2025-02-27 ASSESSMENT — PAIN SCALES - GENERAL
PAINLEVEL_OUTOF10: 8
PAINLEVEL_OUTOF10: 6

## 2025-02-27 ASSESSMENT — PAIN DESCRIPTION - ORIENTATION: ORIENTATION: RIGHT;LOWER

## 2025-02-27 NOTE — ED PROVIDER NOTES
[facility] EMERGENCY DEPARTMENT  EMERGENCY DEPARTMENT ENCOUNTER      Pt Name: Stan Martines  MRN: [id number]  Birthdate 1995  Date of evaluation: 2/27/2025  Provider: Maria Luz White MD    CHIEF COMPLAINT       Chief Complaint   Patient presents with    Dental Pain     HISTORY OF PRESENT ILLNESS   (Location/Symptom, Timing/Onset, Context/Setting, Quality, Duration, Modifying Factors, Severity)  Note limiting factors.   Stan Martines is a 30 y.o. female who presents to the emergency department for dental pain. Pt is here with dental pain and gum swelling for the past week. She Pt was here on 2/4/2025 for Rt hear pain, started flonas and discharged to home. She followed up with her dentist the next day, who prescribed her clindamycin 3 times a day for 10 days, for which she finished over the period of 2 weeks, did not take 3 times a day. She continued to have pain and felt facial swelling, took tylenol and motrin as needed, which helped significantly. She denies other medical problems, no allergies.     REVIEW OF SYSTEMS    (2-9 systems for level 4, 10 or more for level 5)     Review of Systems   Constitutional:  Negative for activity change, appetite change and fever.   HENT:  Positive for dental problem and facial swelling. Negative for ear pain.    Eyes:  Negative for pain.   Respiratory:  Negative for cough.    Cardiovascular:  Negative for chest pain.   Gastrointestinal:  Negative for abdominal pain, diarrhea, nausea and vomiting.   Genitourinary:  Negative for dysuria.   Musculoskeletal:  Negative for gait problem and neck pain.   Neurological:  Positive for headaches. Negative for weakness.       Except as noted above the remainder of the review of systems was reviewed and negative.       PAST MEDICAL HISTORY   History reviewed. No pertinent past medical history.      SURGICAL HISTORY     History reviewed. No pertinent surgical history.      CURRENT MEDICATIONS       Discharge Medication List as of

## 2025-02-27 NOTE — ED NOTES
Patient c/o lower right sided tooth pain and swelling that been going on for 2 weeks.  Patients states saw dentist 2 weeks ago and was started on clindamycin, states she did not take it correctly.  States finished antibiotics 4 days ago.  Patient states feels like it has gotten worse.  Patient took tylenol and ibuprofen around 0900 today  Patient was hoping she could get another round of antibiotics  Patient states pain 6 on pain scale.

## 2025-02-28 NOTE — ED PROVIDER NOTES
[facility] EMERGENCY DEPARTMENT     Emergency Department     Faculty Attestation    I performed a history and physical examination of the patient and discussed management with the resident. I reviewed the resident’s note and agree with the documented findings and plan of care. Any areas of disagreement are noted on the chart. I was personally present for the key portions of any procedures. I have documented in the chart those procedures where I was not present during the key portions. I have reviewed the emergency nurses triage note. I agree with the chief complaint, past medical history, past surgical history, allergies, medications, social and family history as documented unless otherwise noted below. For Physician Assistant/ Nurse Practitioner cases/documentation I have personally evaluated this patient and have completed at least one if not all key elements of the E/M (history, physical exam, and MDM). Additional findings are as noted.    7:19 PM EST    Patient presents with right lower molar dental pain.  Patient was seen here earlier this month and started on a course of antibiotics but says she was not taking it 3 times daily like she was supposed to but says she did complete the antibiotic course.  She says she continues to have pain.  She does not currently have a dentist appointment scheduled.  She denies any fever, nausea or vomiting.  On exam, patient is sitting up in a chair and appears well.  Patient does have poor dentition but no dental abscess is seen.  Will switch antibiotics and treat patient's pain and have her follow-up with dentist.      Isabel Rowell MD  Attending Emergency  Physician

## 2025-02-28 NOTE — DISCHARGE INSTRUCTIONS
Follow up with your dentist as soon as possible   Take augmentin 1 tablet 2 times a day for 7 days  Tylenol and /or motrin every 6 hours as needed for pain    Call 911 anytime you think you may need emergency care. For example, call if:     You have trouble breathing.  Call your dentist or doctor now or seek immediate medical care if:     You have signs of infection, such as:   o Increased pain, swelling, warmth, or redness.   o Red streaks leading from the area.   o Pus draining from the area.   o A fever.  Watch closely for changes in your health, and be sure to contact your doctor if:     You do not get better as expected.